# Patient Record
Sex: FEMALE | Race: OTHER | Employment: FULL TIME | URBAN - METROPOLITAN AREA
[De-identification: names, ages, dates, MRNs, and addresses within clinical notes are randomized per-mention and may not be internally consistent; named-entity substitution may affect disease eponyms.]

---

## 2017-02-24 DIAGNOSIS — Z86.69 HISTORY OF MIGRAINE HEADACHES: Primary | ICD-10-CM

## 2017-02-24 DIAGNOSIS — G43.709 CHRONIC MIGRAINE WITHOUT AURA WITHOUT STATUS MIGRAINOSUS, NOT INTRACTABLE: ICD-10-CM

## 2017-02-24 RX ORDER — ELETRIPTAN HYDROBROMIDE 40 MG/1
TABLET, FILM COATED ORAL
Qty: 12 TABLET | Refills: 2 | Status: SHIPPED | OUTPATIENT
Start: 2017-02-24 | End: 2017-05-15

## 2017-02-24 NOTE — TELEPHONE ENCOUNTER
Medication: Relpax     Date of last refill: 10/10/16  [place]    Last office visit: 7/5/16  Due back to clinic per last office note: 6 months   Date next office visit scheduled: None scheduled     Last OV note recomme

## 2017-02-26 ENCOUNTER — HOSPITAL ENCOUNTER (OUTPATIENT)
Age: 32
Discharge: HOME | End: 2017-02-26
Attending: FAMILY MEDICINE

## 2017-02-26 ENCOUNTER — APPOINTMENT (OUTPATIENT)
Dept: GENERAL RADIOLOGY | Age: 32
End: 2017-02-26
Attending: FAMILY MEDICINE

## 2017-02-26 VITALS
WEIGHT: 253.5 LBS | HEART RATE: 97 BPM | DIASTOLIC BLOOD PRESSURE: 91 MMHG | RESPIRATION RATE: 24 BRPM | BODY MASS INDEX: 37 KG/M2 | TEMPERATURE: 97 F | OXYGEN SATURATION: 98 % | SYSTOLIC BLOOD PRESSURE: 143 MMHG

## 2017-02-26 DIAGNOSIS — J40 BRONCHITIS: Primary | ICD-10-CM

## 2017-02-26 PROCEDURE — 71020 XR CHEST PA + LAT CHEST (CPT=71020): CPT

## 2017-02-26 PROCEDURE — 99214 OFFICE O/P EST MOD 30 MIN: CPT

## 2017-02-26 PROCEDURE — 94640 AIRWAY INHALATION TREATMENT: CPT

## 2017-02-26 RX ORDER — IPRATROPIUM BROMIDE AND ALBUTEROL SULFATE 2.5; .5 MG/3ML; MG/3ML
3 SOLUTION RESPIRATORY (INHALATION) ONCE
Status: COMPLETED | OUTPATIENT
Start: 2017-02-26 | End: 2017-02-26

## 2017-02-26 RX ORDER — PREDNISONE 20 MG/1
TABLET ORAL
Qty: 16 TABLET | Refills: 0 | Status: SHIPPED | OUTPATIENT
Start: 2017-02-26 | End: 2017-03-31 | Stop reason: ALTCHOICE

## 2017-02-26 RX ORDER — PREDNISONE 20 MG/1
60 TABLET ORAL ONCE
Status: COMPLETED | OUTPATIENT
Start: 2017-02-26 | End: 2017-02-26

## 2017-02-26 RX ORDER — CODEINE PHOSPHATE AND GUAIFENESIN 10; 100 MG/5ML; MG/5ML
5 SOLUTION ORAL EVERY 6 HOURS PRN
Qty: 100 ML | Refills: 0 | Status: SHIPPED | OUTPATIENT
Start: 2017-02-26 | End: 2017-03-31 | Stop reason: ALTCHOICE

## 2017-02-26 NOTE — ED INITIAL ASSESSMENT (HPI)
Pt with cough and dyspnea since yesterday morning. Pt with fever yesterday of 100.5. Pt using home nebulizer every 4-5 hours.   Last neb at 10:00am.

## 2017-02-26 NOTE — ED PROVIDER NOTES
Patient Seen in: [facility]    History   Patient presents with:  Cough/URI    Stated Complaint: cough/sinus pressure/sob x 1 day    HPI    28year old female presents for cough, congestion and shortness of breath.  States started 1 TABLET BY MOUTH 1 TIME ONLY. REPEAT AFTER 2 HOURS AS NEEDED.  MAX 2 PER DAY   Lurasidone HCl (LATUDA) 60 MG Oral Tab,  TAKE 1 TABLET(60 MG) BY MOUTH DAILY WITH DINNER   Sertraline HCl 100 MG Oral Tab,  TAKE 2 TABLETS BY MOUTH EVERY NIGHT   LORazepam 0.5 M Relation Age of Onset   • Psychiatric Maternal Grandmother      ALZHEIMER   • Diabetes Maternal Grandmother    • Stroke Maternal Grandmother    • High Cholesterol Mother    • Diabetes Mother    • Stroke Mother    • Depression Mother    • High Cholesterol M normal. She has decreased breath sounds in the right lower field and the left lower field. She has no rhonchi. She has no rales. Neurological: She is alert and oriented to person, place, and time. Skin: Skin is warm and dry.    Psychiatric: She has a no

## 2017-03-07 ENCOUNTER — OFFICE VISIT (OUTPATIENT)
Dept: NEUROLOGY | Facility: CLINIC | Age: 32
End: 2017-03-07

## 2017-03-07 VITALS
BODY MASS INDEX: 39.7 KG/M2 | HEIGHT: 68.5 IN | WEIGHT: 265 LBS | HEART RATE: 90 BPM | SYSTOLIC BLOOD PRESSURE: 118 MMHG | RESPIRATION RATE: 18 BRPM | DIASTOLIC BLOOD PRESSURE: 74 MMHG

## 2017-03-07 DIAGNOSIS — G43.009 MIGRAINE WITHOUT AURA AND WITHOUT STATUS MIGRAINOSUS, NOT INTRACTABLE: Primary | ICD-10-CM

## 2017-03-07 PROCEDURE — 99213 OFFICE O/P EST LOW 20 MIN: CPT | Performed by: OTHER

## 2017-03-07 RX ORDER — TOPIRAMATE 100 MG/1
100 TABLET, FILM COATED ORAL 2 TIMES DAILY
Qty: 180 TABLET | Refills: 0 | Status: SHIPPED | OUTPATIENT
Start: 2017-03-07 | End: 2017-05-15

## 2017-03-07 NOTE — PATIENT INSTRUCTIONS
Refill policies:    • Allow 2 business days for refills; controlled substances may take longer.   • Contact your pharmacy at least 5 days prior to running out of medication and have them send an electronic request or submit request through the “request re your physician has recommended that you have a procedure or additional testing performed. [facility]) will contact your insurance carrier to obtain pre-certification or prior authorization.     Unfortunately, [facility] has seen an increas

## 2017-03-07 NOTE — PROGRESS NOTES
HPI:    Patient ID: Wilmar Jo is a 28year old female. Migraine   Pertinent negatives include no neck pain. Jayro Stahl is here for follow up for headaches. She states migraine were well controlled since we increase Topamax.   Seh is getting mild hea Packs/Day: 1.00  Years: 10        Types: Cigarettes    Smokeless Status: Never Used                        Alcohol Use: No                     Review of Systems   Constitutional: Negative. Eyes: Negative. Respiratory: Negative.     Cardiovascular: Disp: 60 tablet Rfl: 1   LANSOPRAZOLE 30 MG Oral Capsule Delayed Release TAKE ONE CAPSULE BY MOUTH EVERY MORNING BEFORE BREAKFAST Disp: 90 capsule Rfl: 1   Montelukast Sodium (SINGULAIR) 10 MG Oral Tab TAKE 1 TABLET BY MOUTH NIGHTLY Disp: 90 tablet Rfl: 1 aura and without status migrainosus, not intractable  (primary encounter diagnosis)      Continue current medications.  For headache seasonal flare up she can take extra topamax 50 mg during that week  Patient indicates understanding    F/u in 9-12 months

## 2017-04-28 PROBLEM — S63.630A SPRAIN OF INTERPHALANGEAL JOINT OF RIGHT INDEX FINGER, INITIAL ENCOUNTER: Status: ACTIVE | Noted: 2017-04-28

## 2017-04-28 PROBLEM — S63.632A SPRAIN OF INTERPHALANGEAL JOINT OF RIGHT MIDDLE FINGER, INITIAL ENCOUNTER: Status: ACTIVE | Noted: 2017-04-28

## 2017-05-01 ENCOUNTER — LAB ENCOUNTER (OUTPATIENT)
Dept: LAB | Facility: HOSPITAL | Age: 32
End: 2017-05-01
Attending: PHYSICIAN ASSISTANT
Payer: COMMERCIAL

## 2017-05-01 DIAGNOSIS — R10.84 GENERALIZED ABDOMINAL PAIN: ICD-10-CM

## 2017-05-01 DIAGNOSIS — R19.7 DIARRHEA, UNSPECIFIED TYPE: ICD-10-CM

## 2017-05-01 DIAGNOSIS — E61.1 IRON DEFICIENCY: ICD-10-CM

## 2017-05-01 DIAGNOSIS — R11.0 NAUSEA: ICD-10-CM

## 2017-05-01 DIAGNOSIS — E55.9 VITAMIN D DEFICIENCY: ICD-10-CM

## 2017-05-01 PROCEDURE — 82150 ASSAY OF AMYLASE: CPT

## 2017-05-01 PROCEDURE — 80053 COMPREHEN METABOLIC PANEL: CPT

## 2017-05-01 PROCEDURE — 83550 IRON BINDING TEST: CPT

## 2017-05-01 PROCEDURE — 83690 ASSAY OF LIPASE: CPT

## 2017-05-01 PROCEDURE — 85025 COMPLETE CBC W/AUTO DIFF WBC: CPT

## 2017-05-01 PROCEDURE — 36415 COLL VENOUS BLD VENIPUNCTURE: CPT

## 2017-05-01 PROCEDURE — 83540 ASSAY OF IRON: CPT

## 2017-05-01 PROCEDURE — 82306 VITAMIN D 25 HYDROXY: CPT

## 2017-05-02 ENCOUNTER — APPOINTMENT (OUTPATIENT)
Dept: LAB | Facility: HOSPITAL | Age: 32
End: 2017-05-02
Attending: PHYSICIAN ASSISTANT
Payer: COMMERCIAL

## 2017-05-02 DIAGNOSIS — R11.0 NAUSEA: ICD-10-CM

## 2017-05-02 DIAGNOSIS — R10.84 GENERALIZED ABDOMINAL PAIN: ICD-10-CM

## 2017-05-02 DIAGNOSIS — R19.7 DIARRHEA, UNSPECIFIED TYPE: ICD-10-CM

## 2017-05-02 PROCEDURE — 87493 C DIFF AMPLIFIED PROBE: CPT

## 2017-05-03 NOTE — PROGRESS NOTES
Quick Note:    Brianna Isaacs  The vitamin D is low. I sent a prescription for Vitamin D to take twice weekly. It is for 50,000 units per dose. You can stop the over the counter vitamin D.   Shantel Luke    ______

## 2017-05-04 ENCOUNTER — APPOINTMENT (OUTPATIENT)
Dept: GENERAL RADIOLOGY | Facility: HOSPITAL | Age: 32
End: 2017-05-04
Attending: EMERGENCY MEDICINE
Payer: COMMERCIAL

## 2017-05-04 ENCOUNTER — HOSPITAL ENCOUNTER (EMERGENCY)
Facility: HOSPITAL | Age: 32
Discharge: HOME | End: 2017-05-04
Attending: EMERGENCY MEDICINE
Payer: COMMERCIAL

## 2017-05-04 VITALS
SYSTOLIC BLOOD PRESSURE: 123 MMHG | HEART RATE: 60 BPM | BODY MASS INDEX: 38 KG/M2 | WEIGHT: 260 LBS | TEMPERATURE: 98 F | DIASTOLIC BLOOD PRESSURE: 85 MMHG | OXYGEN SATURATION: 100 % | RESPIRATION RATE: 16 BRPM

## 2017-05-04 DIAGNOSIS — R10.9 ABDOMINAL PAIN, ACUTE: Primary | ICD-10-CM

## 2017-05-04 DIAGNOSIS — A04.72 CLOSTRIDIUM DIFFICILE COLITIS: ICD-10-CM

## 2017-05-04 PROCEDURE — 96375 TX/PRO/DX INJ NEW DRUG ADDON: CPT

## 2017-05-04 PROCEDURE — 80053 COMPREHEN METABOLIC PANEL: CPT | Performed by: EMERGENCY MEDICINE

## 2017-05-04 PROCEDURE — 83690 ASSAY OF LIPASE: CPT | Performed by: EMERGENCY MEDICINE

## 2017-05-04 PROCEDURE — 85025 COMPLETE CBC W/AUTO DIFF WBC: CPT | Performed by: EMERGENCY MEDICINE

## 2017-05-04 PROCEDURE — 74020 XR ABDOMEN, OBSTRUCTIVE SERIES (CPT=74020): CPT | Performed by: EMERGENCY MEDICINE

## 2017-05-04 PROCEDURE — 99285 EMERGENCY DEPT VISIT HI MDM: CPT

## 2017-05-04 PROCEDURE — 99284 EMERGENCY DEPT VISIT MOD MDM: CPT

## 2017-05-04 PROCEDURE — 96361 HYDRATE IV INFUSION ADD-ON: CPT

## 2017-05-04 PROCEDURE — 96365 THER/PROPH/DIAG IV INF INIT: CPT

## 2017-05-04 RX ORDER — HYDROMORPHONE HYDROCHLORIDE 1 MG/ML
INJECTION, SOLUTION INTRAMUSCULAR; INTRAVENOUS; SUBCUTANEOUS EVERY 30 MIN PRN
Status: DISCONTINUED | OUTPATIENT
Start: 2017-05-04 | End: 2017-05-05

## 2017-05-05 NOTE — ED PROVIDER NOTES
Patient Seen in: [facility] Emergency Department    History   Patient presents with:  Abdomen/Flank Pain (GI/)    Stated Complaint: abd pain     HPI    Patient was diagnosed with C. difficile 4 days ago. She has had it a few years ago as well.   Mata Sol 10-4-14       Medications :   lurasidone HCl (LATUDA) 80 MG Oral Tab,  Take 1 tablet (80 mg total) by mouth daily with dinner.    Sertraline HCl 100 MG Oral Tab,  TAKE 2 TABLETS BY MOUTH EVERY NIGHT   LORazepam 0.5 MG Oral Tab,  Take 1-2 tab by mouth at bed ONCE DAILY       Family History   Problem Relation Age of Onset   • Psychiatric Maternal Grandmother      ALZHEIMER   • Diabetes Maternal Grandmother    • Stroke Maternal Grandmother    • High Cholesterol Mother    • Diabetes Mother    • Stroke Mother swelling, deformity   Skin: No significant lesions   Neuro: Grossly intact to patient's baseline      ED Course     Labs Reviewed   COMP METABOLIC PANEL (14) - Abnormal; Notable for the following:     BUN 6 (*)     AST 12 (*)     Chloride 113 (*)     CO2 1

## 2017-05-05 NOTE — ED INITIAL ASSESSMENT (HPI)
31YF c/c of abd pain pt state she being treated for c-diff Pt state that tonight she started sudden increased abd pain

## 2017-05-09 ENCOUNTER — HOSPITAL ENCOUNTER (OUTPATIENT)
Dept: ULTRASOUND IMAGING | Age: 32
Discharge: HOME | End: 2017-05-09
Attending: NURSE PRACTITIONER
Payer: COMMERCIAL

## 2017-05-09 DIAGNOSIS — R11.0 NAUSEA: ICD-10-CM

## 2017-05-09 DIAGNOSIS — R10.811 RIGHT UPPER QUADRANT ABDOMINAL TENDERNESS, REBOUND TENDERNESS PRESENCE NOT SPECIFIED: ICD-10-CM

## 2017-05-09 PROCEDURE — 76700 US EXAM ABDOM COMPLETE: CPT | Performed by: NURSE PRACTITIONER

## 2017-05-15 ENCOUNTER — OFFICE VISIT (OUTPATIENT)
Dept: NEUROLOGY | Facility: CLINIC | Age: 32
End: 2017-05-15

## 2017-05-15 VITALS
HEART RATE: 78 BPM | BODY MASS INDEX: 38.34 KG/M2 | HEIGHT: 68 IN | RESPIRATION RATE: 16 BRPM | DIASTOLIC BLOOD PRESSURE: 82 MMHG | SYSTOLIC BLOOD PRESSURE: 120 MMHG | WEIGHT: 253 LBS

## 2017-05-15 DIAGNOSIS — G43.709 CHRONIC MIGRAINE W/O AURA W/O STATUS MIGRAINOSUS, NOT INTRACTABLE: Primary | ICD-10-CM

## 2017-05-15 PROCEDURE — 99213 OFFICE O/P EST LOW 20 MIN: CPT | Performed by: OTHER

## 2017-05-15 PROCEDURE — 96372 THER/PROPH/DIAG INJ SC/IM: CPT | Performed by: OTHER

## 2017-05-15 RX ORDER — NARATRIPTAN 2.5 MG/1
2.5 TABLET ORAL AS NEEDED
Qty: 8 TABLET | Refills: 0 | Status: SHIPPED | OUTPATIENT
Start: 2017-05-15 | End: 2017-06-09

## 2017-05-15 RX ORDER — DEXAMETHASONE SODIUM PHOSPHATE 10 MG/ML
10 INJECTION, SOLUTION INTRAMUSCULAR; INTRAVENOUS ONCE
Status: COMPLETED | OUTPATIENT
Start: 2017-05-15 | End: 2017-05-15

## 2017-05-15 RX ORDER — TOPIRAMATE 100 MG/1
TABLET, FILM COATED ORAL
Qty: 90 TABLET | Refills: 5 | Status: SHIPPED | OUTPATIENT
Start: 2017-05-15 | End: 2018-01-29

## 2017-05-15 RX ORDER — METRONIDAZOLE 500 MG/1
TABLET ORAL 3 TIMES DAILY
Refills: 0 | COMMUNITY
Start: 2017-05-02 | End: 2017-06-09 | Stop reason: ALTCHOICE

## 2017-05-15 RX ADMIN — DEXAMETHASONE SODIUM PHOSPHATE 10 MG: 10 INJECTION, SOLUTION INTRAMUSCULAR; INTRAVENOUS at 16:58:00

## 2017-05-15 NOTE — PATIENT INSTRUCTIONS
Refill policies:    • Allow 2 business days for refills; controlled substances may take longer.   • Contact your pharmacy at least 5 days prior to running out of medication and have them send an electronic request or submit request through the “request re insurance carrier to obtain pre-certification or prior authorization. Unfortunately, [facility] has seen an increase in denial of payment even though the procedure/test has been pre-certified.   You are strongly encouraged to contact your insurance carrier to v

## 2017-05-16 ENCOUNTER — OFFICE VISIT (OUTPATIENT)
Dept: SURGERY | Facility: CLINIC | Age: 32
End: 2017-05-16

## 2017-05-16 VITALS
SYSTOLIC BLOOD PRESSURE: 111 MMHG | HEART RATE: 82 BPM | DIASTOLIC BLOOD PRESSURE: 80 MMHG | WEIGHT: 252 LBS | HEIGHT: 68 IN | BODY MASS INDEX: 38.19 KG/M2

## 2017-05-16 DIAGNOSIS — K80.10 CALCULUS OF GALLBLADDER WITH CHOLECYSTITIS WITHOUT BILIARY OBSTRUCTION, UNSPECIFIED CHOLECYSTITIS ACUITY: Primary | ICD-10-CM

## 2017-05-16 PROCEDURE — 99243 OFF/OP CNSLTJ NEW/EST LOW 30: CPT | Performed by: SURGERY

## 2017-05-16 NOTE — PROGRESS NOTES
HPI:    Patient ID: Angie Walter is a 28year old female. Migraine   Pertinent negatives include no neck pain. Amandabisi Mark is here for follow up for headaches. She states her headache got worse again.  She lately has been under stress a lot and that see • Hypertension Maternal Grandfather    • Substance Abuse Father         Smoking Status: Current Every Day Smoker        Packs/Day: 1.00  Years: 10        Types: Cigarettes    Smokeless Status: Never Used                        Alcohol Use: No Breath. Disp: 1 Inhaler Rfl: 2   Norethindrone 0.35 MG Oral Tab Take one tablet daily Disp: 3 Package Rfl: 4   Butalbital-APAP-Caffeine -40 MG Oral Tab Take 1 tablet by mouth every 12 (twelve) hours as needed.  Disp: 60 tablet Rfl: 1   LANSOPRAZOLE 30 bilaterally. Finger-to-nose coordination is intact. Gait brisk and steady. ASSESSMENT/PLAN:   Chronic migraine w/o aura w/o status migrainosus, not intractable  (primary encounter diagnosis)    Mild worsening headaches could be due to stress.  If p

## 2017-05-17 ENCOUNTER — TELEPHONE (OUTPATIENT)
Dept: SURGERY | Facility: CLINIC | Age: 32
End: 2017-05-17

## 2017-05-17 ENCOUNTER — APPOINTMENT (OUTPATIENT)
Dept: LAB | Facility: HOSPITAL | Age: 32
End: 2017-05-17
Payer: COMMERCIAL

## 2017-05-17 DIAGNOSIS — K80.10 CALCULUS OF GALLBLADDER WITH CHOLECYSTITIS WITHOUT BILIARY OBSTRUCTION, UNSPECIFIED CHOLECYSTITIS ACUITY: ICD-10-CM

## 2017-05-17 DIAGNOSIS — K80.01 CALCULUS OF GALLBLADDER WITH ACUTE CHOLECYSTITIS AND OBSTRUCTION: Primary | ICD-10-CM

## 2017-05-17 PROBLEM — K80.20 CHOLELITHIASIS: Status: ACTIVE | Noted: 2017-05-17

## 2017-05-17 PROCEDURE — 36415 COLL VENOUS BLD VENIPUNCTURE: CPT

## 2017-05-17 PROCEDURE — 85730 THROMBOPLASTIN TIME PARTIAL: CPT

## 2017-05-17 PROCEDURE — 85610 PROTHROMBIN TIME: CPT

## 2017-05-17 NOTE — H&P
New Patient  Alanna Ríos  5/27/1985 5/17/2017    This patient was referred by Fidencio Hamilton MD for evaluation and consultation for abdominal pain and GI symptoms. Chief Complaint: Abdominal pain    History of Present Illness:  The patient is a 30-ye COLPO   • MTHFR (methylene THF reductase) deficiency and homocystinuria (HCC)    • Protein S deficiency (HCC)    • Esophageal reflux    • Extrinsic asthma, unspecified    • Obesity, unspecified    • Chronic rhinitis    • Anxiety state, unspecified    • Uns 2.5 MG Oral Tab, Take 1 tablet (2.5 mg total) by mouth as needed for Migraine.  Use at onset; may repeat once after 4 hours- ONLY 2 IN 24 HOUR PERIOD MAX, Disp: 8 tablet, Rfl: 0  •  Sertraline HCl 100 MG Oral Tab, TAKE 2 TABLETS BY MOUTH EVERY NIGHT, Disp: Comment:SYEDA SX  Adhesive Tape           Hives  Doxycycline                 Comment:SYEDA upset    Family History   Problem Relation Age of Onset   • Psychiatric Maternal Grandmother      ALZHEIMER   • Diabetes Maternal Grandmother    • Stroke Maternal Grandmot questions were presented at this time. Assessment   Assessment:      The patient is a 62-year-old female who is here for evaluation of abdominal pain. The patient was recently diagnosed with her second episode of C. difficile colitis.   This was thought Vivian Rodriguez MD

## 2017-05-22 ENCOUNTER — SURGERY (OUTPATIENT)
Age: 32
End: 2017-05-22

## 2017-05-22 ENCOUNTER — ANESTHESIA EVENT (OUTPATIENT)
Dept: SURGERY | Facility: HOSPITAL | Age: 32
End: 2017-05-22
Payer: COMMERCIAL

## 2017-05-22 ENCOUNTER — APPOINTMENT (OUTPATIENT)
Dept: GENERAL RADIOLOGY | Facility: HOSPITAL | Age: 32
End: 2017-05-22
Attending: SURGERY
Payer: COMMERCIAL

## 2017-05-22 ENCOUNTER — HOSPITAL ENCOUNTER (OUTPATIENT)
Facility: HOSPITAL | Age: 32
Setting detail: HOSPITAL OUTPATIENT SURGERY
Discharge: HOME | End: 2017-05-22
Attending: SURGERY | Admitting: SURGERY
Payer: COMMERCIAL

## 2017-05-22 ENCOUNTER — ANESTHESIA (OUTPATIENT)
Dept: SURGERY | Facility: HOSPITAL | Age: 32
End: 2017-05-22
Payer: COMMERCIAL

## 2017-05-22 VITALS
OXYGEN SATURATION: 100 % | HEIGHT: 68 IN | WEIGHT: 258 LBS | HEART RATE: 80 BPM | TEMPERATURE: 98 F | DIASTOLIC BLOOD PRESSURE: 80 MMHG | BODY MASS INDEX: 39.1 KG/M2 | RESPIRATION RATE: 18 BRPM | SYSTOLIC BLOOD PRESSURE: 130 MMHG

## 2017-05-22 DIAGNOSIS — K80.10 CALCULUS OF GALLBLADDER WITH CHOLECYSTITIS WITHOUT BILIARY OBSTRUCTION, UNSPECIFIED CHOLECYSTITIS ACUITY: Primary | ICD-10-CM

## 2017-05-22 PROCEDURE — 0FT44ZZ RESECTION OF GALLBLADDER, PERCUTANEOUS ENDOSCOPIC APPROACH: ICD-10-PCS | Performed by: SURGERY

## 2017-05-22 PROCEDURE — 88304 TISSUE EXAM BY PATHOLOGIST: CPT | Performed by: SURGERY

## 2017-05-22 PROCEDURE — 81025 URINE PREGNANCY TEST: CPT | Performed by: SURGERY

## 2017-05-22 PROCEDURE — 74300 X-RAY BILE DUCTS/PANCREAS: CPT | Performed by: SURGERY

## 2017-05-22 RX ORDER — HYDROCODONE BITARTRATE AND ACETAMINOPHEN 5; 325 MG/1; MG/1
2 TABLET ORAL AS NEEDED
Status: DISCONTINUED | OUTPATIENT
Start: 2017-05-22 | End: 2017-05-22

## 2017-05-22 RX ORDER — SODIUM CHLORIDE, SODIUM LACTATE, POTASSIUM CHLORIDE, CALCIUM CHLORIDE 600; 310; 30; 20 MG/100ML; MG/100ML; MG/100ML; MG/100ML
INJECTION, SOLUTION INTRAVENOUS CONTINUOUS
Status: DISCONTINUED | OUTPATIENT
Start: 2017-05-22 | End: 2017-05-22

## 2017-05-22 RX ORDER — BUPIVACAINE HYDROCHLORIDE AND EPINEPHRINE 5; 5 MG/ML; UG/ML
INJECTION, SOLUTION EPIDURAL; INTRACAUDAL; PERINEURAL AS NEEDED
Status: DISCONTINUED | OUTPATIENT
Start: 2017-05-22 | End: 2017-05-22 | Stop reason: HOSPADM

## 2017-05-22 RX ORDER — HEPARIN SODIUM 5000 [USP'U]/ML
5000 INJECTION, SOLUTION INTRAVENOUS; SUBCUTANEOUS ONCE
Status: COMPLETED | OUTPATIENT
Start: 2017-05-22 | End: 2017-05-22

## 2017-05-22 RX ORDER — HYDROCODONE BITARTRATE AND ACETAMINOPHEN 5; 325 MG/1; MG/1
1 TABLET ORAL AS NEEDED
Status: DISCONTINUED | OUTPATIENT
Start: 2017-05-22 | End: 2017-05-22

## 2017-05-22 RX ORDER — HYDROMORPHONE HYDROCHLORIDE 1 MG/ML
0.4 INJECTION, SOLUTION INTRAMUSCULAR; INTRAVENOUS; SUBCUTANEOUS EVERY 5 MIN PRN
Status: DISCONTINUED | OUTPATIENT
Start: 2017-05-22 | End: 2017-05-22

## 2017-05-22 RX ORDER — MEPERIDINE HYDROCHLORIDE 25 MG/ML
INJECTION INTRAMUSCULAR; INTRAVENOUS; SUBCUTANEOUS
Status: DISCONTINUED
Start: 2017-05-22 | End: 2017-05-22 | Stop reason: WASHOUT

## 2017-05-22 RX ORDER — HYDROMORPHONE HYDROCHLORIDE 1 MG/ML
INJECTION, SOLUTION INTRAMUSCULAR; INTRAVENOUS; SUBCUTANEOUS
Status: COMPLETED
Start: 2017-05-22 | End: 2017-05-22

## 2017-05-22 RX ORDER — ONDANSETRON 2 MG/ML
4 INJECTION INTRAMUSCULAR; INTRAVENOUS AS NEEDED
Status: DISCONTINUED | OUTPATIENT
Start: 2017-05-22 | End: 2017-05-22

## 2017-05-22 RX ORDER — CEFOXITIN 2 G/1
INJECTION, POWDER, FOR SOLUTION INTRAVENOUS
Status: DISCONTINUED | OUTPATIENT
Start: 2017-05-22 | End: 2017-05-22

## 2017-05-22 RX ORDER — NALOXONE HYDROCHLORIDE 0.4 MG/ML
80 INJECTION, SOLUTION INTRAMUSCULAR; INTRAVENOUS; SUBCUTANEOUS AS NEEDED
Status: DISCONTINUED | OUTPATIENT
Start: 2017-05-22 | End: 2017-05-22

## 2017-05-22 RX ORDER — HEPARIN SODIUM 5000 [USP'U]/ML
INJECTION, SOLUTION INTRAVENOUS; SUBCUTANEOUS
Status: COMPLETED
Start: 2017-05-22 | End: 2017-05-22

## 2017-05-22 NOTE — BRIEF OP NOTE
Pre-Operative Diagnosis: Calculus of gallbladder with cholecystitis without biliary obstruction, unspecified cholecystitis acuity [K80.00]     Post-Operative Diagnosis: Cholelithiasis without biliary obstruction-ne IOC     Procedure Performed:   Procedu

## 2017-05-22 NOTE — ANESTHESIA PREPROCEDURE EVALUATION
PRE-OP EVALUATION    Patient Name: Calixto Mendez    Pre-op Diagnosis: Calculus of gallbladder with cholecystitis without biliary obstruction, unspecified cholecystitis acuity [K80.00]    Procedure(s):  LAPAROSCOPIC CHOLECYSTECTOMY WITH CHOLANGIOGRAM    Pena (2.5 MG/3ML) 0.083% Inhalation Nebu Soln Take 3 mL (2.5 mg total) by nebulization every 6 (six) hours as needed.  Disp: 1 Bottle Rfl: 6   Albuterol Sulfate  (90 BASE) MCG/ACT Inhalation Aero Soln Inhale 2 puffs into the lungs every 4 (four) hours as ENDOSCOPY W/US FN BX  05/06/2011    COLONOSCOPY  05/06/2011    Comment wnl per pt    COLONOSCOPY      OTHER SURGICAL HISTORY Right 1998    Comment KNEE ARTHROSCOPY    OTHER SURGICAL HISTORY      Comment left eye tear duct    OTHER SURGICAL HISTORY      Com

## 2017-05-22 NOTE — INTERVAL H&P NOTE
Pre-op Diagnosis: Calculus of gallbladder with cholecystitis without biliary obstruction, unspecified cholecystitis acuity [K80.00]    The above referenced H&P was reviewed by Elba Fay MD on 5/22/2017, the patient was examined and no significant austin

## 2017-05-22 NOTE — OPERATIVE REPORT
[facility]   Operative Note    [facility] Location: OR   [facility] [id number] MRN [id number]   Admission Date 5/22/2017 Operation Date 5/22/2017   Attending Physician Ana Soriano MD Operating Physician Miranda Monsivais MD     Date of procedure:    May ultrasound was ordered at that time however as her symptoms did resolve she did not proceed with diagnostic imaging at that time. On physical examination, Finesse Reyes does have some tenderness in the right upper quadrant.   Treatment options were reviewed and a to 15 mm mercury. The Veress needle was then exchanged for a 11 mm port. The laparoscope was introduced. A 5 mm epigastric port and two 5 mm lateral ports were placed under direct vision without incidence.   The patient was placed into a reverse Trendelenb was placed into a Endopouch and was withdrawn through the umbilical port. The right upper quadrant was copiously irrigated until the irrigant was clear.  The clips across the cystic duct and the cystic artery were examined and found to be well approximated

## 2017-05-22 NOTE — ANESTHESIA POSTPROCEDURE EVALUATION
[address] [facility] Patient Status:  Hospital Outpatient Surgery   Age/Gender 28year old female MRN [id number]   Location [address] [facility] Attending Annabel Palmer MD   Hosp Day # 0 PCP MD Khoa George

## 2017-05-22 NOTE — H&P (VIEW-ONLY)
New Patient  Suzie Bolivar  5/27/1985 5/17/2017    This patient was referred by Sumi Moore MD for evaluation and consultation for abdominal pain and GI symptoms. Chief Complaint: Abdominal pain    History of Present Illness:  The patient is a 30-ye COLPO   • MTHFR (methylene THF reductase) deficiency and homocystinuria (HCC)    • Protein S deficiency (HCC)    • Esophageal reflux    • Extrinsic asthma, unspecified    • Obesity, unspecified    • Chronic rhinitis    • Anxiety state, unspecified    • Uns 2.5 MG Oral Tab, Take 1 tablet (2.5 mg total) by mouth as needed for Migraine.  Use at onset; may repeat once after 4 hours- ONLY 2 IN 24 HOUR PERIOD MAX, Disp: 8 tablet, Rfl: 0  •  Sertraline HCl 100 MG Oral Tab, TAKE 2 TABLETS BY MOUTH EVERY NIGHT, Disp: Comment:SYEDA SX  Adhesive Tape           Hives  Doxycycline                 Comment:SYEDA upset    Family History   Problem Relation Age of Onset   • Psychiatric Maternal Grandmother      ALZHEIMER   • Diabetes Maternal Grandmother    • Stroke Maternal Grandmot questions were presented at this time. Assessment   Assessment:      The patient is a 73-year-old female who is here for evaluation of abdominal pain. The patient was recently diagnosed with her second episode of C. difficile colitis.   This was thought Shawn Eagle MD

## 2017-05-24 ENCOUNTER — TELEPHONE (OUTPATIENT)
Dept: SURGERY | Facility: CLINIC | Age: 32
End: 2017-05-24

## 2017-05-24 NOTE — TELEPHONE ENCOUNTER
Spoke to patient 5/23/17. Patient is p/o lap shannan done 5/22/17 with Dr. Fred Vazquez. C/o 7/10 abdominal pain at incision by her belly button. Taking Norco. Denies any nausea, vomiting, fever or chills. Notified Dr. Fred Vazquez.  Called patient today 5/24/17 for condit

## 2017-05-26 ENCOUNTER — TELEPHONE (OUTPATIENT)
Dept: SURGERY | Facility: CLINIC | Age: 32
End: 2017-05-26

## 2017-05-26 NOTE — TELEPHONE ENCOUNTER
5/22 adi campbell noticed bruise at umbilical incision after vacuming. Instructed pt to limited this activity along with lifting, pushing, pulling for at least 1 week. Pt denies fever, chills, drainage. Pt also verbalized understanding.

## 2017-06-06 ENCOUNTER — TELEPHONE (OUTPATIENT)
Dept: NEUROLOGY | Facility: CLINIC | Age: 32
End: 2017-06-06

## 2017-06-06 ENCOUNTER — OFFICE VISIT (OUTPATIENT)
Dept: SURGERY | Facility: CLINIC | Age: 32
End: 2017-06-06

## 2017-06-06 VITALS
DIASTOLIC BLOOD PRESSURE: 80 MMHG | HEIGHT: 68 IN | BODY MASS INDEX: 39.1 KG/M2 | WEIGHT: 258 LBS | HEART RATE: 78 BPM | SYSTOLIC BLOOD PRESSURE: 114 MMHG

## 2017-06-06 DIAGNOSIS — Z86.69 HISTORY OF MIGRAINE HEADACHES: Primary | ICD-10-CM

## 2017-06-06 DIAGNOSIS — Z90.49 S/P LAPAROSCOPIC CHOLECYSTECTOMY: Primary | ICD-10-CM

## 2017-06-06 PROCEDURE — 99024 POSTOP FOLLOW-UP VISIT: CPT | Performed by: SURGERY

## 2017-06-06 RX ORDER — HYDROCODONE BITARTRATE AND ACETAMINOPHEN 5; 325 MG/1; MG/1
TABLET ORAL
Refills: 0 | COMMUNITY
Start: 2017-05-22 | End: 2017-06-06

## 2017-06-06 RX ORDER — METHYLPREDNISOLONE 4 MG/1
TABLET ORAL
Qty: 1 KIT | Refills: 0 | Status: SHIPPED | OUTPATIENT
Start: 2017-06-06 | End: 2017-06-28

## 2017-06-06 NOTE — TELEPHONE ENCOUNTER
Patient states she has a Migraine - day 3, states \"today is worst day\", patient reports migraine started at right temple and moved to right eye, she sates pain is throbbing with stabbing pain in the eye and forehead.  Patient states \"Today my whole head

## 2017-06-06 NOTE — TELEPHONE ENCOUNTER
Spoke with patient and informed medrol does mike approved by Dr Nara Munroe and sent to pharmacy. She verbalized understanding. Offered patient follow up appt with Dr Nara Munroe 6/9/17 at 1:40 pm to discuss medications . Appt accepted. Patient scheduled.

## 2017-06-06 NOTE — PROGRESS NOTES
Post Operative Visit Note       Active Problems  No diagnosis found. Chief Complaint   Patient presents with:  Gallbladder: 1st PO LAPAROSCOPIC CHOLECYSTECTOMY WITH CHOLANGIOGRAM on 5/22 @.          History of Present Illness         Allergies  Gia Au Stroke Maternal Grandmother    • High Cholesterol Mother    • Diabetes Mother    • Stroke Mother    • Depression Mother    • High Cholesterol Maternal Grandfather    • Stroke Maternal Grandfather    • Hypertension Maternal Grandfather    • Substance Abuse mouth twice a week. Disp: 8 capsule Rfl: 11   B Complex Vitamins (B COMPLEX OR) Take 1 capsule by mouth every evening. Disp:  Rfl:    Dicyclomine HCl 10 MG Oral Cap Take 10 mg by mouth as needed.  Disp:  Rfl:    albuterol sulfate (2.5 MG/3ML) 0.083% Inhalat syncope and weakness. Hematological: Negative for adenopathy. Does not bruise/bleed easily. Psychiatric/Behavioral: Negative for behavioral problems and sleep disturbance.        Physical Findings   LMP 05/12/2017  Physical Exam   Assessment    Assessme

## 2017-06-07 NOTE — PROGRESS NOTES
GENERAL SURGERY    Freddy Pemberton MD, FACS, Carla Walton. Jose Schneider MD, Srinivasan Renteria MD, FACS  Fatimah Marrero.  Fatemeh Joseph MD, Ashley Anthony MD, JOHNATHAN Teague PA-C, PA-C [facility]

## 2017-06-09 ENCOUNTER — OFFICE VISIT (OUTPATIENT)
Dept: NEUROLOGY | Facility: CLINIC | Age: 32
End: 2017-06-09

## 2017-06-09 VITALS
HEART RATE: 80 BPM | DIASTOLIC BLOOD PRESSURE: 76 MMHG | WEIGHT: 250 LBS | RESPIRATION RATE: 16 BRPM | BODY MASS INDEX: 38 KG/M2 | SYSTOLIC BLOOD PRESSURE: 126 MMHG

## 2017-06-09 DIAGNOSIS — G43.909 MIXED MIGRAINE AND MUSCLE CONTRACTION HEADACHE: Primary | ICD-10-CM

## 2017-06-09 DIAGNOSIS — G44.209 MIXED MIGRAINE AND MUSCLE CONTRACTION HEADACHE: Primary | ICD-10-CM

## 2017-06-09 PROCEDURE — 99213 OFFICE O/P EST LOW 20 MIN: CPT | Performed by: OTHER

## 2017-06-09 RX ORDER — ELETRIPTAN HYDROBROMIDE 40 MG/1
40 TABLET, FILM COATED ORAL AS NEEDED
Qty: 8 TABLET | Refills: 2 | Status: SHIPPED | OUTPATIENT
Start: 2017-06-09 | End: 2017-07-09

## 2017-06-09 RX ORDER — TIZANIDINE 2 MG/1
2 TABLET ORAL NIGHTLY
Qty: 30 TABLET | Refills: 2 | Status: SHIPPED | OUTPATIENT
Start: 2017-06-09 | End: 2019-06-04

## 2017-06-09 NOTE — PATIENT INSTRUCTIONS
Refill policies:    • Allow 2-3 business days for refills; controlled substances may take longer.   • Contact your pharmacy at least 5 days prior to running out of medication and have them send an electronic request or submit request through the [facility] have a procedure or additional testing performed. [facility]) will contact your insurance carrier to obtain pre-certification or prior authorization.     Unfortunately, [facility] has seen an increase in denial of payment even though the p

## 2017-06-09 NOTE — PROGRESS NOTES
HPI:    Patient ID: Lina Cedillo is a 33year old female. Migraine   Pertinent negatives include no neck pain. Finesse [place] is here for follow up for headaches. She states last week had a severe headache requiring medrol dose pack.  She complaints of nec History   Problem Relation Age of Onset   • Psychiatric Maternal Grandmother      ALZHEIMER   • Diabetes Maternal Grandmother    • Stroke Maternal Grandmother    • High Cholesterol Mother    • Diabetes Mother    • Stroke Mother    • Depression Mother    • Oral Cap Take 10 mg by mouth as needed. Disp:  Rfl:    albuterol sulfate (2.5 MG/3ML) 0.083% Inhalation Nebu Soln Take 3 mL (2.5 mg total) by nebulization every 6 (six) hours as needed.  Disp: 1 Bottle Rfl: 6   Albuterol Sulfate  (90 BASE) MCG/ACT In bilaterally. The rest of the cranial nerves are grossly intact. Sensation to light touch is intact bilaterally. Motor:  No arm or leg weakness noted. 5/5 bilaterally. Finger-to-nose coordination is intact. Gait brisk and steady.           ASSESSMENT/PLAN:

## 2017-08-09 ENCOUNTER — OFFICE VISIT (OUTPATIENT)
Dept: NEUROLOGY | Facility: CLINIC | Age: 32
End: 2017-08-09

## 2017-08-09 VITALS
HEART RATE: 64 BPM | WEIGHT: 260 LBS | BODY MASS INDEX: 40 KG/M2 | DIASTOLIC BLOOD PRESSURE: 70 MMHG | SYSTOLIC BLOOD PRESSURE: 106 MMHG | RESPIRATION RATE: 16 BRPM

## 2017-08-09 DIAGNOSIS — G44.209 MIXED MIGRAINE AND MUSCLE CONTRACTION HEADACHE: Primary | ICD-10-CM

## 2017-08-09 DIAGNOSIS — G43.909 MIXED MIGRAINE AND MUSCLE CONTRACTION HEADACHE: Primary | ICD-10-CM

## 2017-08-09 PROCEDURE — 99213 OFFICE O/P EST LOW 20 MIN: CPT | Performed by: OTHER

## 2017-08-09 RX ORDER — ELETRIPTAN HYDROBROMIDE 40 MG/1
TABLET, FILM COATED ORAL
Refills: 0 | COMMUNITY
Start: 2017-07-31 | End: 2017-08-09

## 2017-08-09 RX ORDER — ELETRIPTAN HYDROBROMIDE 40 MG/1
40 TABLET, FILM COATED ORAL AS NEEDED
Qty: 12 TABLET | Refills: 2 | Status: SHIPPED | OUTPATIENT
Start: 2017-08-09 | End: 2018-07-02

## 2017-08-09 NOTE — PATIENT INSTRUCTIONS
Refill policies:    • Allow 2-3 business days for refills; controlled substances may take longer.   • Contact your pharmacy at least 5 days prior to running out of medication and have them send an electronic request or submit request through the [facility] have a procedure or additional testing performed. [facility]) will contact your insurance carrier to obtain pre-certification or prior authorization.     Unfortunately, [facility] has seen an increase in denial of payment even though the p

## 2017-08-09 NOTE — PROGRESS NOTES
HPI:    Patient ID: Suzie Bolivar is a 33year old female. HPI   Patient is here for follow up for migraines and medication check.  She reports headaches have been better since last visit but has had couple migraine due to humid/rainy weather but they h Stroke Mother    • Depression Mother    • High Cholesterol Maternal Grandfather    • Stroke Maternal Grandfather    • Hypertension Maternal Grandfather    • Substance Abuse Father       Smoking status: Current Every Day Smoker Rfl: 6   Albuterol Sulfate  (90 BASE) MCG/ACT Inhalation Aero Soln Inhale 2 puffs into the lungs every 4 (four) hours as needed for Wheezing or Shortness of Breath.  Disp: 1 Inhaler Rfl: 2   Norethindrone 0.35 MG Oral Tab Take one tablet daily Disp: and steady. ASSESSMENT/PLAN:   Mixed migraine and muscle contraction headache  (primary encounter diagnosis)    Headaches well controlled on current regime   Continue current medications.     Follow up in 5-6 months  No orders of the defined type

## 2017-08-09 NOTE — PROGRESS NOTES
Pt here for follow up for migraines, and a medication check. Pt reports that the weather is affecting her migraines, but they have been \"manageable. \"

## 2017-09-10 ENCOUNTER — HOSPITAL ENCOUNTER (OUTPATIENT)
Age: 32
Discharge: HOME | End: 2017-09-10
Attending: FAMILY MEDICINE
Payer: COMMERCIAL

## 2017-09-10 VITALS
DIASTOLIC BLOOD PRESSURE: 75 MMHG | RESPIRATION RATE: 16 BRPM | SYSTOLIC BLOOD PRESSURE: 131 MMHG | OXYGEN SATURATION: 100 % | HEART RATE: 90 BPM | TEMPERATURE: 98 F

## 2017-09-10 DIAGNOSIS — Z76.0 ENCOUNTER FOR MEDICATION REFILL: ICD-10-CM

## 2017-09-10 DIAGNOSIS — J45.20 MILD INTERMITTENT ASTHMA WITHOUT COMPLICATION: ICD-10-CM

## 2017-09-10 DIAGNOSIS — H69.83 DYSFUNCTION OF BOTH EUSTACHIAN TUBES: ICD-10-CM

## 2017-09-10 DIAGNOSIS — J02.9 ACUTE PHARYNGITIS, UNSPECIFIED ETIOLOGY: ICD-10-CM

## 2017-09-10 DIAGNOSIS — J30.2 ACUTE SEASONAL ALLERGIC RHINITIS, UNSPECIFIED TRIGGER: Primary | ICD-10-CM

## 2017-09-10 LAB — POCT RAPID STREP: NEGATIVE

## 2017-09-10 PROCEDURE — 99214 OFFICE O/P EST MOD 30 MIN: CPT

## 2017-09-10 PROCEDURE — 87081 CULTURE SCREEN ONLY: CPT | Performed by: FAMILY MEDICINE

## 2017-09-10 PROCEDURE — 87430 STREP A AG IA: CPT | Performed by: FAMILY MEDICINE

## 2017-09-10 RX ORDER — PREDNISONE 20 MG/1
TABLET ORAL
Qty: 10 TABLET | Refills: 0 | Status: SHIPPED | OUTPATIENT
Start: 2017-09-10 | End: 2017-10-07 | Stop reason: ALTCHOICE

## 2017-09-10 RX ORDER — FLUTICASONE PROPIONATE 50 MCG
SPRAY, SUSPENSION (ML) NASAL
Qty: 1 INHALER | Refills: 0 | Status: SHIPPED | OUTPATIENT
Start: 2017-09-10 | End: 2018-08-13 | Stop reason: ALTCHOICE

## 2017-09-10 RX ORDER — ALBUTEROL SULFATE 90 UG/1
2 AEROSOL, METERED RESPIRATORY (INHALATION) EVERY 4 HOURS PRN
Qty: 1 INHALER | Refills: 0 | Status: SHIPPED | OUTPATIENT
Start: 2017-09-10 | End: 2020-04-17 | Stop reason: ALTCHOICE

## 2017-09-10 NOTE — ED PROVIDER NOTES
Patient Seen in: [facility]    History   Patient presents with:  Cough/URI    Stated Complaint: sore throat, itchy ears, rt side of face swollen, cough, x 2 days    HPI    This 52-year-old female presents to the office with comp treatment  05/06/2011: ESOPH ENDOSCOPY W/US FN BX  10-4-14: MIRENA, IUD, 5 YEAR      Comment: AND REMOVAL  No date: MYRINGOTOMY, LASER-ASSISTED  1998: OTHER SURGICAL HISTORY Right      Comment: KNEE ARTHROSCOPY  No date: OTHER SURGICAL HISTORY      Comment noted.  EARS: Tympanic membranes normal color but both are retracted, EAC's normal.  NOSE: Turbinates markedly congested, no bleeding noted.   PHARYNX:  Cobblestoning is noted, no exudates seen, airway patent, uvula midline  NECK:  No cervical lymphadenopat days  If symptoms worsen      Medications Prescribed:  Current Discharge Medication List    START taking these medications    predniSONE 20 MG Oral Tab  Take 2 tablets once daily with breakfast for 5 days.   Qty: 10 tablet Refills: 0  Associated Diagnoses:A

## 2017-09-10 NOTE — ED INITIAL ASSESSMENT (HPI)
Pt presents to the immediate care due to right eyelid swelling, right sided facial swelling, ear pain (R>L), and sore throat. Pt states she started with what she thought was allergy symptoms 5 days ago and 3 days ago felt like she was getting ill.  Hyacinth benites

## 2017-10-07 ENCOUNTER — HOSPITAL ENCOUNTER (OUTPATIENT)
Age: 32
Discharge: HOME | End: 2017-10-07
Attending: FAMILY MEDICINE
Payer: COMMERCIAL

## 2017-10-07 VITALS
OXYGEN SATURATION: 100 % | SYSTOLIC BLOOD PRESSURE: 148 MMHG | HEART RATE: 70 BPM | TEMPERATURE: 98 F | DIASTOLIC BLOOD PRESSURE: 92 MMHG | RESPIRATION RATE: 20 BRPM

## 2017-10-07 DIAGNOSIS — J06.9 ACUTE URI: ICD-10-CM

## 2017-10-07 DIAGNOSIS — J20.9 ACUTE BRONCHITIS, UNSPECIFIED ORGANISM: Primary | ICD-10-CM

## 2017-10-07 PROCEDURE — 99214 OFFICE O/P EST MOD 30 MIN: CPT

## 2017-10-07 PROCEDURE — 99213 OFFICE O/P EST LOW 20 MIN: CPT

## 2017-10-07 RX ORDER — METHYLPREDNISOLONE 4 MG/1
TABLET ORAL
Qty: 1 PACKAGE | Refills: 0 | Status: SHIPPED | OUTPATIENT
Start: 2017-10-07 | End: 2018-01-29

## 2017-10-07 NOTE — ED PROVIDER NOTES
Patient Seen in: THE [facility] [place]    History   Patient presents with:  Cough/URI    Stated Complaint: COUGH/SOB/TIGHTNESS IN CHEST X2-3 DAYS    HPI    Kyle Vogel is a 33year old female presents with complaint of sinus pressure, p Comment: left eye tear duct  No date: OTHER SURGICAL HISTORY      Comment: nasal polyp removal, turbinet surgery  No date: TONSILLECTOMY    Family History   Problem Relation Age of Onset   • Psychiatric Maternal Grandmother      ALZHEIMER   • Diabetes Mate display    ============================================================  ED Course  ------------------------------------------------------------  MDM   Patient with the possible viral bronchitis and bronchospasm or allergies.   We will give prednisone for a

## 2017-10-07 NOTE — ED INITIAL ASSESSMENT (HPI)
Pt c/o sinus pressure, ear tickle, post nasal drip and cough. States having bronchospasm type cough.   (Currently on Vancomycin for c-diff following prescription of Augmentin for sinusitis)

## 2018-01-29 DIAGNOSIS — G44.209 MIXED MIGRAINE AND MUSCLE CONTRACTION HEADACHE: Primary | ICD-10-CM

## 2018-01-29 DIAGNOSIS — G43.909 MIXED MIGRAINE AND MUSCLE CONTRACTION HEADACHE: Primary | ICD-10-CM

## 2018-01-29 NOTE — TELEPHONE ENCOUNTER
Medication: Topamax     Date of last refill: 5/15/17  [place]    Last office visit: 8/9/2017  Due back to clinic per last office note: 5-6 months   Date next office visit scheduled:  Future Appointments  Date Time Pro

## 2018-01-30 RX ORDER — TOPIRAMATE 100 MG/1
TABLET, FILM COATED ORAL
Qty: 90 TABLET | Refills: 0 | Status: SHIPPED | OUTPATIENT
Start: 2018-01-30 | End: 2018-06-11

## 2018-04-01 DIAGNOSIS — G44.209 MIXED MIGRAINE AND MUSCLE CONTRACTION HEADACHE: ICD-10-CM

## 2018-04-01 DIAGNOSIS — G43.909 MIXED MIGRAINE AND MUSCLE CONTRACTION HEADACHE: ICD-10-CM

## 2018-04-02 DIAGNOSIS — Z86.69 HISTORY OF MIGRAINE HEADACHES: ICD-10-CM

## 2018-04-02 DIAGNOSIS — G43.709 CHRONIC MIGRAINE WITHOUT AURA WITHOUT STATUS MIGRAINOSUS, NOT INTRACTABLE: ICD-10-CM

## 2018-04-02 NOTE — TELEPHONE ENCOUNTER
Sent patient a [employer] message to please make appointment      Medication: Topiramate 100mg     Date of last refill: 1/30/18  [place]     Last office visit: 8/9/17  Due back to clinic per last office note:  5-6 months  D

## 2018-04-02 NOTE — TELEPHONE ENCOUNTER
Sent patient a [employer] message to please make appointment      Medication: Topiramate 100mg and Eletriptan 40mg      Date of last refill: 1/30/18 and 8/9/17  [place]      Last office visit: 8/9/17  Due back to clinic per

## 2018-04-04 ENCOUNTER — TELEPHONE (OUTPATIENT)
Dept: NEUROLOGY | Facility: CLINIC | Age: 33
End: 2018-04-04

## 2018-04-04 RX ORDER — TOPIRAMATE 100 MG/1
TABLET, FILM COATED ORAL
Qty: 60 TABLET | Refills: 1 | Status: SHIPPED | OUTPATIENT
Start: 2018-04-04 | End: 2018-05-22

## 2018-04-04 RX ORDER — ELETRIPTAN HYDROBROMIDE 40 MG/1
TABLET, FILM COATED ORAL
Qty: 12 TABLET | Refills: 1 | Status: SHIPPED | OUTPATIENT
Start: 2018-04-04 | End: 2018-07-02

## 2018-04-04 NOTE — TELEPHONE ENCOUNTER
Patient upset no one contacted her regarding needing an appointment. Informed patient that a [employer] message was sent to her. 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  Dr Wilmar Dawson schedule not ye

## 2018-04-10 NOTE — TELEPHONE ENCOUNTER
Spoke to patient and she informed me that she can't make appointment for June because Dr. Maura August schedule is not open yet. Patient also stated that she was informed by another nurse that she has enough to get her to June.   I informed patient to please

## 2018-04-11 RX ORDER — TOPIRAMATE 100 MG/1
TABLET, FILM COATED ORAL
Qty: 90 TABLET | Refills: 0 | OUTPATIENT
Start: 2018-04-11

## 2018-05-19 ENCOUNTER — LAB ENCOUNTER (OUTPATIENT)
Dept: LAB | Facility: HOSPITAL | Age: 33
End: 2018-05-19
Attending: PHYSICIAN ASSISTANT
Payer: COMMERCIAL

## 2018-05-19 DIAGNOSIS — R19.7 DIARRHEA, UNSPECIFIED TYPE: ICD-10-CM

## 2018-05-19 DIAGNOSIS — D50.9 IRON DEFICIENCY ANEMIA, UNSPECIFIED IRON DEFICIENCY ANEMIA TYPE: ICD-10-CM

## 2018-05-19 DIAGNOSIS — R63.4 WEIGHT LOSS: ICD-10-CM

## 2018-05-19 DIAGNOSIS — E53.8 VITAMIN B12 DEFICIENCY: ICD-10-CM

## 2018-05-19 DIAGNOSIS — R10.84 ABDOMINAL PAIN, GENERALIZED: ICD-10-CM

## 2018-05-19 DIAGNOSIS — R53.83 FATIGUE, UNSPECIFIED TYPE: ICD-10-CM

## 2018-05-19 DIAGNOSIS — E55.9 VITAMIN D DEFICIENCY: ICD-10-CM

## 2018-05-19 PROCEDURE — 36415 COLL VENOUS BLD VENIPUNCTURE: CPT

## 2018-05-19 PROCEDURE — 87493 C DIFF AMPLIFIED PROBE: CPT

## 2018-05-19 PROCEDURE — 85652 RBC SED RATE AUTOMATED: CPT

## 2018-05-19 PROCEDURE — 82746 ASSAY OF FOLIC ACID SERUM: CPT

## 2018-05-19 PROCEDURE — 85025 COMPLETE CBC W/AUTO DIFF WBC: CPT

## 2018-05-19 PROCEDURE — 82607 VITAMIN B-12: CPT

## 2018-05-19 PROCEDURE — 84443 ASSAY THYROID STIM HORMONE: CPT

## 2018-05-19 PROCEDURE — 82728 ASSAY OF FERRITIN: CPT

## 2018-05-19 PROCEDURE — 82784 ASSAY IGA/IGD/IGG/IGM EACH: CPT

## 2018-05-19 PROCEDURE — 83516 IMMUNOASSAY NONANTIBODY: CPT

## 2018-05-19 PROCEDURE — 83540 ASSAY OF IRON: CPT

## 2018-05-19 PROCEDURE — 80053 COMPREHEN METABOLIC PANEL: CPT

## 2018-05-19 PROCEDURE — 86140 C-REACTIVE PROTEIN: CPT

## 2018-05-19 PROCEDURE — 84439 ASSAY OF FREE THYROXINE: CPT

## 2018-05-19 PROCEDURE — 83550 IRON BINDING TEST: CPT

## 2018-06-11 DIAGNOSIS — G44.209 MIXED MIGRAINE AND MUSCLE CONTRACTION HEADACHE: ICD-10-CM

## 2018-06-11 DIAGNOSIS — G43.909 MIXED MIGRAINE AND MUSCLE CONTRACTION HEADACHE: ICD-10-CM

## 2018-06-11 NOTE — TELEPHONE ENCOUNTER
Medication: Topamax 100 mg     Date of last refill: 1/30/18  [place]    Last office visit: 5-6 months   Due back to clinic per last office note: 5-6 months   Date next office visit scheduled:  Future Appointments  Ad

## 2018-06-12 RX ORDER — TOPIRAMATE 100 MG/1
TABLET, FILM COATED ORAL
Qty: 75 TABLET | Refills: 0 | Status: SHIPPED | OUTPATIENT
Start: 2018-06-12 | End: 2018-07-02

## 2018-07-02 ENCOUNTER — OFFICE VISIT (OUTPATIENT)
Dept: NEUROLOGY | Facility: CLINIC | Age: 33
End: 2018-07-02

## 2018-07-02 VITALS
HEART RATE: 76 BPM | RESPIRATION RATE: 16 BRPM | SYSTOLIC BLOOD PRESSURE: 126 MMHG | DIASTOLIC BLOOD PRESSURE: 88 MMHG | BODY MASS INDEX: 32.58 KG/M2 | WEIGHT: 215 LBS | HEIGHT: 68 IN

## 2018-07-02 DIAGNOSIS — G44.209 MIXED MIGRAINE AND MUSCLE CONTRACTION HEADACHE: ICD-10-CM

## 2018-07-02 DIAGNOSIS — G43.909 MIXED MIGRAINE AND MUSCLE CONTRACTION HEADACHE: ICD-10-CM

## 2018-07-02 PROCEDURE — 99213 OFFICE O/P EST LOW 20 MIN: CPT | Performed by: OTHER

## 2018-07-02 RX ORDER — TOPIRAMATE 100 MG/1
TABLET, FILM COATED ORAL
Qty: 75 TABLET | Refills: 2 | Status: SHIPPED | OUTPATIENT
Start: 2018-07-02 | End: 2018-11-08

## 2018-07-02 RX ORDER — ELETRIPTAN HYDROBROMIDE 40 MG/1
40 TABLET, FILM COATED ORAL AS NEEDED
Qty: 12 TABLET | Refills: 2 | Status: SHIPPED | OUTPATIENT
Start: 2018-07-02 | End: 2018-08-13

## 2018-07-02 RX ORDER — BUTALBITAL, ACETAMINOPHEN AND CAFFEINE 50; 325; 40 MG/1; MG/1; MG/1
1 TABLET ORAL EVERY 12 HOURS PRN
Qty: 60 TABLET | Refills: 1 | Status: SHIPPED | OUTPATIENT
Start: 2018-07-02 | End: 2018-12-31

## 2018-07-02 NOTE — PATIENT INSTRUCTIONS
Refill policies:    • Allow 2-3 business days for refills; controlled substances may take longer.   • Contact your pharmacy at least 5 days prior to running out of medication and have them send an electronic request or submit request through the “request re entire amount billed. Precertification and Prior Authorizations: If your physician has recommended that you have a procedure or additional testing performed.   [facility]) will contact your insurance carrier to obtain pre-certi

## 2018-07-02 NOTE — PROGRESS NOTES
HPI:    Patient ID: Rosana Narvaez is a 29year old female. HPI     Patient is here for follow up for migraines and medication check. Headaches remains stable but having issues with Relpax.  States pharmacy gave her generic Relpax which does not work as Onset   • Psychiatric Maternal Grandmother      ALZHEIMER   • Diabetes Maternal Grandmother    • Stroke Maternal Grandmother    • Bleeding Disorders Maternal Grandmother    • High Cholesterol Mother    • Diabetes Mother    • Stroke Mother    • Depression M MONTELUKAST SODIUM 10 MG Oral Tab TAKE 1 TABLET(10 MG) BY MOUTH EVERY NIGHT Disp: 30 tablet Rfl: 0   lurasidone HCl (LATUDA) 80 MG Oral Tab TAKE 1 TABLET BY MOUTH DAILY WITH DINNER Disp: 90 tablet Rfl: 0   LORazepam 0.5 MG Oral Tab Take 1-2 tab by mouth affect. NEUROLOGICAL: This patient is alert and orientated x 3. Speech is fluent with intact comprehension. Pupils equally round and reactive to light. 3+ brisk bilaterally. EOMs intact. Visual fields are full. Face is symmetrical. Tongue is midline.  Uvu

## 2018-07-03 ENCOUNTER — TELEPHONE (OUTPATIENT)
Dept: NEUROLOGY | Facility: CLINIC | Age: 33
End: 2018-07-03

## 2018-07-03 DIAGNOSIS — G43.709 CHRONIC MIGRAINE WITHOUT AURA WITHOUT STATUS MIGRAINOSUS, NOT INTRACTABLE: ICD-10-CM

## 2018-07-05 RX ORDER — ELETRIPTAN HYDROBROMIDE 40 MG/1
TABLET, FILM COATED ORAL
Qty: 12 TABLET | Refills: 2 | Status: SHIPPED | OUTPATIENT
Start: 2018-07-05 | End: 2019-08-26

## 2018-07-05 NOTE — TELEPHONE ENCOUNTER
Per Dr Tariq Porter:  Epimenio Keyla we have any co-pay card or any program that patient can be eligible for ? Brand Relpax works better for her than generic Relpax. LMTCB on unidentified voicemail. Relpax RX card available for patient to  at .  She may

## 2018-07-05 NOTE — TELEPHONE ENCOUNTER
Patient returned call. States with the current storms they are triggering her migraines and she is down to just one Relpax. Patient requesting generic form be ordered to give her some relief until she receives the card.  Card placed in the mail per patient

## 2018-07-06 NOTE — TELEPHONE ENCOUNTER
Pt called back having not listened to VM left. Relayed message from [employer]. No further questions.

## 2018-07-06 NOTE — TELEPHONE ENCOUNTER
LMTCB with patient. Generic Relpax sent to pharmacy by Dr. Jared Queen. Also active on [employer], informed pt on VM that we would send message to her. Encouraged pt to contact office with additional concerns/questions.

## 2018-07-10 ENCOUNTER — HOSPITAL ENCOUNTER (EMERGENCY)
Facility: HOSPITAL | Age: 33
Discharge: HOME | End: 2018-07-10
Payer: COMMERCIAL

## 2018-07-10 ENCOUNTER — APPOINTMENT (OUTPATIENT)
Dept: CT IMAGING | Facility: HOSPITAL | Age: 33
End: 2018-07-10
Payer: COMMERCIAL

## 2018-07-10 VITALS
OXYGEN SATURATION: 98 % | HEIGHT: 68 IN | WEIGHT: 215 LBS | SYSTOLIC BLOOD PRESSURE: 127 MMHG | DIASTOLIC BLOOD PRESSURE: 88 MMHG | RESPIRATION RATE: 21 BRPM | TEMPERATURE: 98 F | HEART RATE: 45 BPM | BODY MASS INDEX: 32.58 KG/M2

## 2018-07-10 DIAGNOSIS — R00.2 PALPITATIONS: ICD-10-CM

## 2018-07-10 DIAGNOSIS — T50.905A MEDICATION SIDE EFFECT, INITIAL ENCOUNTER: ICD-10-CM

## 2018-07-10 DIAGNOSIS — R51.9 ACUTE NONINTRACTABLE HEADACHE, UNSPECIFIED HEADACHE TYPE: Primary | ICD-10-CM

## 2018-07-10 LAB
ATRIAL RATE: 44 BPM
ATRIAL RATE: 47 BPM
BUN BLD-MCNC: 4 MG/DL (ref 8–20)
CALCIUM BLD-MCNC: 8.6 MG/DL (ref 8.3–10.3)
CHLORIDE: 113 MMOL/L (ref 101–111)
CO2: 21 MMOL/L (ref 22–32)
CREAT BLD-MCNC: 0.8 MG/DL (ref 0.55–1.02)
GLUCOSE BLD-MCNC: 114 MG/DL (ref 70–99)
P AXIS: 20 DEGREES
P AXIS: 29 DEGREES
P-R INTERVAL: 148 MS
P-R INTERVAL: 152 MS
POTASSIUM SERPL-SCNC: 3.2 MMOL/L (ref 3.6–5.1)
Q-T INTERVAL: 468 MS
Q-T INTERVAL: 488 MS
QRS DURATION: 92 MS
QRS DURATION: 92 MS
QTC CALCULATION (BEZET): 414 MS
QTC CALCULATION (BEZET): 417 MS
R AXIS: 54 DEGREES
R AXIS: 56 DEGREES
SODIUM SERPL-SCNC: 143 MMOL/L (ref 136–144)
T AXIS: 44 DEGREES
T AXIS: 58 DEGREES
TROPONIN: <0.046 NG/ML (ref ?–0.05)
VENTRICULAR RATE: 44 BPM
VENTRICULAR RATE: 47 BPM

## 2018-07-10 PROCEDURE — 96375 TX/PRO/DX INJ NEW DRUG ADDON: CPT

## 2018-07-10 PROCEDURE — 80048 BASIC METABOLIC PNL TOTAL CA: CPT

## 2018-07-10 PROCEDURE — 93010 ELECTROCARDIOGRAM REPORT: CPT

## 2018-07-10 PROCEDURE — 70450 CT HEAD/BRAIN W/O DYE: CPT

## 2018-07-10 PROCEDURE — 99285 EMERGENCY DEPT VISIT HI MDM: CPT

## 2018-07-10 PROCEDURE — 96361 HYDRATE IV INFUSION ADD-ON: CPT

## 2018-07-10 PROCEDURE — 96374 THER/PROPH/DIAG INJ IV PUSH: CPT

## 2018-07-10 PROCEDURE — 84484 ASSAY OF TROPONIN QUANT: CPT

## 2018-07-10 PROCEDURE — 93005 ELECTROCARDIOGRAM TRACING: CPT

## 2018-07-10 RX ORDER — KETOROLAC TROMETHAMINE 30 MG/ML
10 INJECTION, SOLUTION INTRAMUSCULAR; INTRAVENOUS ONCE
Status: COMPLETED | OUTPATIENT
Start: 2018-07-10 | End: 2018-07-10

## 2018-07-10 RX ORDER — SODIUM CHLORIDE 9 MG/ML
INJECTION, SOLUTION INTRAVENOUS ONCE
Status: DISCONTINUED | OUTPATIENT
Start: 2018-07-10 | End: 2018-07-10

## 2018-07-10 RX ORDER — SODIUM CHLORIDE 9 MG/ML
1000 INJECTION, SOLUTION INTRAVENOUS ONCE
Status: COMPLETED | OUTPATIENT
Start: 2018-07-10 | End: 2018-07-10

## 2018-07-10 RX ORDER — DEXAMETHASONE SODIUM PHOSPHATE 4 MG/ML
10 VIAL (ML) INJECTION ONCE
Status: COMPLETED | OUTPATIENT
Start: 2018-07-10 | End: 2018-07-10

## 2018-07-10 RX ORDER — ONDANSETRON 4 MG/1
4 TABLET, ORALLY DISINTEGRATING ORAL EVERY 6 HOURS PRN
Qty: 10 TABLET | Refills: 0 | Status: SHIPPED | OUTPATIENT
Start: 2018-07-10 | End: 2018-07-17

## 2018-07-10 RX ORDER — METOCLOPRAMIDE HYDROCHLORIDE 5 MG/ML
5 INJECTION INTRAMUSCULAR; INTRAVENOUS ONCE
Status: COMPLETED | OUTPATIENT
Start: 2018-07-10 | End: 2018-07-10

## 2018-07-10 RX ORDER — POTASSIUM CHLORIDE 20 MEQ/1
40 TABLET, EXTENDED RELEASE ORAL ONCE
Status: COMPLETED | OUTPATIENT
Start: 2018-07-10 | End: 2018-07-10

## 2018-07-10 RX ORDER — DIPHENHYDRAMINE HYDROCHLORIDE 50 MG/ML
25 INJECTION INTRAMUSCULAR; INTRAVENOUS ONCE
Status: COMPLETED | OUTPATIENT
Start: 2018-07-10 | End: 2018-07-10

## 2018-07-10 RX ORDER — POTASSIUM CHLORIDE 750 MG/1
10 TABLET, EXTENDED RELEASE ORAL DAILY
Qty: 7 TABLET | Refills: 0 | Status: SHIPPED | OUTPATIENT
Start: 2018-07-10 | End: 2018-07-17

## 2018-07-10 NOTE — ED PROVIDER NOTES
Patient Seen in: [facility] Emergency Department    History   Patient presents with:  Headache (neurologic)    Stated Complaint: HEADACHE    HPI    Pleasant female presents with headache, come on over the last day, it is worse overnight tonight to w unspecified    • Fibromyalgia    • Gallstone    • H/O bronchitis 2015    GETS IT YEARLY   • Migraines    • Moderate dysplasia of cervix 2006    COLPO   • MTHFR (methylene THF reductase) deficiency and homocystinuria (HCC)    • Obesity, unspecified    • Pro Well developed, well nourished, alert       Head: Normocephalic and atraumatic. Sclera anicteric, conjunctiva pink and moist.   PERRL, EOMI      Mucus membranes pink and moist,   no stridor or trismus    Neck: Supple with normal range of motion.   Casilda Koyanagi INDICATIONS:  HEADACHE  TECHNIQUE:  Noncontrast CT scanning is performed through the brain. Dose reduction techniques were used.  Dose information is transmitted to the [facility] ([address] [place] [address] of Radiology) Erinn Baca [address] ([address]) which includ discharge instuctions were discussed given the patient's ER course. We discussed signs and symptoms that should prompt the patient's immediate return to the emergency department.    Reasonable over the counter and prescription treatment options and [facility]

## 2018-07-10 NOTE — ED INITIAL ASSESSMENT (HPI)
Pt reports headache staring around 2000 last noc. Recently came off Wellbutrin, last dose Friday morning, took 150mg this morning. +lights bothering eyes as well.

## 2018-07-10 NOTE — ED NOTES
Pt ambulated in room with monitor on. Heart rate did not go over 66, no ectopic beats noted. Pt asymptomatic. Ok to be discharged.

## 2018-08-04 ENCOUNTER — HOSPITAL ENCOUNTER (OUTPATIENT)
Age: 33
Discharge: HOME | End: 2018-08-04
Payer: COMMERCIAL

## 2018-08-04 ENCOUNTER — APPOINTMENT (OUTPATIENT)
Dept: CT IMAGING | Age: 33
End: 2018-08-04
Attending: PHYSICIAN ASSISTANT
Payer: COMMERCIAL

## 2018-08-04 ENCOUNTER — APPOINTMENT (OUTPATIENT)
Dept: GENERAL RADIOLOGY | Age: 33
End: 2018-08-04
Attending: PHYSICIAN ASSISTANT
Payer: COMMERCIAL

## 2018-08-04 VITALS
SYSTOLIC BLOOD PRESSURE: 109 MMHG | OXYGEN SATURATION: 100 % | TEMPERATURE: 98 F | RESPIRATION RATE: 16 BRPM | HEART RATE: 51 BPM | DIASTOLIC BLOOD PRESSURE: 53 MMHG

## 2018-08-04 DIAGNOSIS — S40.021A ARM CONTUSION, RIGHT, INITIAL ENCOUNTER: ICD-10-CM

## 2018-08-04 DIAGNOSIS — S83.411A SPRAIN OF MEDIAL COLLATERAL LIGAMENT OF RIGHT KNEE, INITIAL ENCOUNTER: ICD-10-CM

## 2018-08-04 DIAGNOSIS — S39.012A STRAIN OF LUMBAR REGION, INITIAL ENCOUNTER: Primary | ICD-10-CM

## 2018-08-04 LAB — POCT URINE PREGNANCY: NEGATIVE

## 2018-08-04 PROCEDURE — 73560 X-RAY EXAM OF KNEE 1 OR 2: CPT | Performed by: PHYSICIAN ASSISTANT

## 2018-08-04 PROCEDURE — 72110 X-RAY EXAM L-2 SPINE 4/>VWS: CPT | Performed by: PHYSICIAN ASSISTANT

## 2018-08-04 PROCEDURE — 81025 URINE PREGNANCY TEST: CPT | Performed by: PHYSICIAN ASSISTANT

## 2018-08-04 PROCEDURE — 99214 OFFICE O/P EST MOD 30 MIN: CPT

## 2018-08-04 PROCEDURE — 99215 OFFICE O/P EST HI 40 MIN: CPT

## 2018-08-04 PROCEDURE — 72131 CT LUMBAR SPINE W/O DYE: CPT | Performed by: PHYSICIAN ASSISTANT

## 2018-08-04 RX ORDER — ACETAMINOPHEN 325 MG/1
650 TABLET ORAL ONCE
Status: COMPLETED | OUTPATIENT
Start: 2018-08-04 | End: 2018-08-04

## 2018-08-04 NOTE — ED PROVIDER NOTES
Patient Seen in: [facility]    History   Patient presents with:  Fall (musculoskeletal, neurologic)    Stated Complaint: TPL - BODY ACHES/FELL    HPI    CHIEF COMPLAINT: Multiple injury status post fall    HISTORY OF PRESENT ILL WOKE UP DURING A PROCEDURE FOR MIGRAINES   • Anxiety state, unspecified    • Asthma    • Cervical high risk human papillomavirus (HPV) DNA test positive    • Chronic rhinitis    • DEPRESSION    • Esophageal reflux    • Extrinsic asthma, unspecified    • F O2 Device: None (Room air)    Current:/53   Pulse 51   Temp 98 °F (36.7 °C) (Oral)   Resp 16   LMP 07/23/2018 (Approximate)   SpO2 100%         Physical Exam  Nursing notes and vital signs reviewed       General Appearance: Alert oriented x4 no acute Right knee: No edema, ecchymosis or obvious signs of dislocation or fracture. No erythema, open wound. The quadriceps and patellar tendons are intact. Patella is with normal lie, intact.   Tenderness noted along the medial joint line with moderate tender 1455: Patient x-ray results showed a questionable L2 transverse process fracture, patient is tender at this point patient will be sent over CT for CT of the lumbar spine. I discussed the radiology results with the patient.  I discussed the diagnosis

## 2018-08-04 NOTE — ED INITIAL ASSESSMENT (HPI)
Patient presents with cc of fall at [facility] yesterday on a wet floor s/p mopping. Did the splits injury and injured right upper on bag turnstyle and inner right knee. No head injury of LOC.

## 2018-08-30 ENCOUNTER — HOSPITAL ENCOUNTER (OUTPATIENT)
Dept: MRI IMAGING | Age: 33
Discharge: HOME | End: 2018-08-30
Attending: ORTHOPAEDIC SURGERY
Payer: COMMERCIAL

## 2018-08-30 DIAGNOSIS — S83.411A SPRAIN OF MEDIAL COLLATERAL LIGAMENT OF RIGHT KNEE, INITIAL ENCOUNTER: ICD-10-CM

## 2018-08-30 PROCEDURE — 73721 MRI JNT OF LWR EXTRE W/O DYE: CPT | Performed by: ORTHOPAEDIC SURGERY

## 2018-08-30 NOTE — PROGRESS NOTES
Muscle strain in back of knee. No specific treatment needed. Can do physical therapy.  Takes 4-6 weeks to fully improve

## 2018-10-22 ENCOUNTER — TELEPHONE (OUTPATIENT)
Dept: NEUROLOGY | Facility: CLINIC | Age: 33
End: 2018-10-22

## 2018-10-22 NOTE — TELEPHONE ENCOUNTER
Received request for generic authorization from [employer] for Relpax 40 mg. Informed pharmacy that last refill from [employer] was on 07/05/2018 for Eletriptan Hydrobromide 40 mg. Employee states she will investigate and let us know what is needed.

## 2018-11-08 DIAGNOSIS — G44.209 MIXED MIGRAINE AND MUSCLE CONTRACTION HEADACHE: ICD-10-CM

## 2018-11-08 DIAGNOSIS — G43.909 MIXED MIGRAINE AND MUSCLE CONTRACTION HEADACHE: ICD-10-CM

## 2018-11-09 RX ORDER — TOPIRAMATE 100 MG/1
TABLET, FILM COATED ORAL
Qty: 75 TABLET | Refills: 0 | Status: SHIPPED | OUTPATIENT
Start: 2018-11-09 | End: 2018-12-07

## 2018-12-07 DIAGNOSIS — G43.909 MIXED MIGRAINE AND MUSCLE CONTRACTION HEADACHE: ICD-10-CM

## 2018-12-07 DIAGNOSIS — G44.209 MIXED MIGRAINE AND MUSCLE CONTRACTION HEADACHE: ICD-10-CM

## 2018-12-11 RX ORDER — TOPIRAMATE 100 MG/1
TABLET, FILM COATED ORAL
Qty: 75 TABLET | Refills: 1 | Status: SHIPPED | OUTPATIENT
Start: 2018-12-11 | End: 2019-03-12

## 2018-12-25 ENCOUNTER — HOSPITAL ENCOUNTER (OUTPATIENT)
Age: 33
Discharge: HOME | End: 2018-12-25
Payer: COMMERCIAL

## 2018-12-25 VITALS
HEART RATE: 86 BPM | OXYGEN SATURATION: 100 % | DIASTOLIC BLOOD PRESSURE: 78 MMHG | RESPIRATION RATE: 18 BRPM | SYSTOLIC BLOOD PRESSURE: 134 MMHG | TEMPERATURE: 99 F

## 2018-12-25 DIAGNOSIS — H00.033 CELLULITIS OF RIGHT EYELID: Primary | ICD-10-CM

## 2018-12-25 PROCEDURE — 99214 OFFICE O/P EST MOD 30 MIN: CPT

## 2018-12-25 PROCEDURE — 99213 OFFICE O/P EST LOW 20 MIN: CPT

## 2018-12-25 RX ORDER — CEPHALEXIN 500 MG/1
500 CAPSULE ORAL 3 TIMES DAILY
Qty: 15 CAPSULE | Refills: 0 | Status: SHIPPED | OUTPATIENT
Start: 2018-12-25 | End: 2018-12-30

## 2018-12-25 NOTE — ED PROVIDER NOTES
Patient Seen in: [facility]    History   Patient presents with:   Eye Visual Problem (opthalmic)    Stated Complaint: HAS A STYE IN EYE AND ITS GETTING WORSE, SWELLING GOING INTO SHEEK    28-year-old female presents today with in COLPOSCOPY,BX CERVIX/ENDOCERV CURR  2009, 2006    Has had 2-moderate dysplasia, no treatment   • ENDOSCOPY, BOWEL POUCH, BIOPSY  05/24/2018    Christopher/gastritis   • ESOPH ENDOSCOPY W/US FN BX  05/06/2011   • LAPAROSCOPIC CHOLECYSTECTOMY N/A 5/22/2017 the right upper eyelid. Does have some mild swelling to the area or periorbital region. Has pain with palpation of the over the upper eyelid but no pain to the inferior periorbital region. Neck: Normal range of motion. Neck supple.    Cardiovascular: No

## 2018-12-25 NOTE — ED INITIAL ASSESSMENT (HPI)
Saturday 12/22 noticed a bump to the outer upper  corner of the eyelid. States that on Sunday she developed upper eye lid swelling. Patient was seen at [facility] immediate care yesterday and was prescribed antibiotic eye drops.  Patient states that today she

## 2018-12-26 ENCOUNTER — HOSPITAL ENCOUNTER (EMERGENCY)
Age: 33
Discharge: HOME | End: 2018-12-26
Attending: EMERGENCY MEDICINE
Payer: COMMERCIAL

## 2018-12-26 VITALS
OXYGEN SATURATION: 100 % | HEART RATE: 85 BPM | SYSTOLIC BLOOD PRESSURE: 129 MMHG | BODY MASS INDEX: 31.83 KG/M2 | DIASTOLIC BLOOD PRESSURE: 88 MMHG | HEIGHT: 68 IN | WEIGHT: 210 LBS | TEMPERATURE: 98 F | RESPIRATION RATE: 18 BRPM

## 2018-12-26 DIAGNOSIS — L03.213 PRESEPTAL CELLULITIS OF RIGHT EYE: Primary | ICD-10-CM

## 2018-12-26 PROCEDURE — 99283 EMERGENCY DEPT VISIT LOW MDM: CPT | Performed by: EMERGENCY MEDICINE

## 2018-12-26 RX ORDER — POLYMYXIN B SULFATE AND TRIMETHOPRIM 1; 10000 MG/ML; [USP'U]/ML
1 SOLUTION OPHTHALMIC
Qty: 10 ML | Refills: 0 | Status: SHIPPED | OUTPATIENT
Start: 2018-12-26 | End: 2018-12-31

## 2018-12-26 RX ORDER — CEFDINIR 300 MG/1
300 CAPSULE ORAL 2 TIMES DAILY
Qty: 20 CAPSULE | Refills: 0 | Status: SHIPPED | OUTPATIENT
Start: 2018-12-26 | End: 2019-01-05

## 2018-12-26 RX ORDER — TETRACAINE HYDROCHLORIDE 5 MG/ML
1 SOLUTION OPHTHALMIC ONCE
Status: COMPLETED | OUTPATIENT
Start: 2018-12-26 | End: 2018-12-26

## 2018-12-26 NOTE — ED PROVIDER NOTES
I reviewed that chart and discussed the case. I have examined the patient and noted right upper eyelid swelling slight erythema right lower lid swelling but no erythema no pain under consensual light exam or no pain under range of motion exercises. Gus Patrick

## 2018-12-26 NOTE — ED PROVIDER NOTES
Patient Seen in: THE [facility] Emergency Department In [place]    History   Patient presents with:  Eyelid Swelling    Stated Complaint: right eye swelling: on oral and optho abx    HPI  Patient is a 56-year-old female who presents with right upper and lower deficiency    • Walking pneumonia 2015       Past Surgical History:   Procedure Laterality Date   • ADENOIDECTOMY     • CHOLECYSTECTOMY  5/22/17   • COLONOSCOPY  05/06/2011    wnl per pt   • COLONOSCOPY     • COLONOSCOPY  05/24/2018    Christopher/TIFFANIE   • well-nourished. No distress.    HENT:     Right Eye Procedure  Applied 2 drops of tetracaine to the eye  Eyelids everted and swept    No foreign body seen   Applied fluorescein strip to the lower inner eyelid  No Corneal abrasion noted with woods lamp hours while awake for 5 days. Qty: 10 mL Refills: 0    cefdinir 300 MG Oral Cap  Take 1 capsule (300 mg total) by mouth 2 (two) times daily for 10 days.   Qty: 20 capsule Refills: 0

## 2018-12-26 NOTE — ED INITIAL ASSESSMENT (HPI)
Swelling to right eye since Saturday. Treated for stye and cellulitis. States swelling worse today, just started keflex last night, has had two doses so far.

## 2018-12-31 ENCOUNTER — OFFICE VISIT (OUTPATIENT)
Dept: NEUROLOGY | Facility: CLINIC | Age: 33
End: 2018-12-31
Payer: COMMERCIAL

## 2018-12-31 VITALS
BODY MASS INDEX: 31 KG/M2 | DIASTOLIC BLOOD PRESSURE: 70 MMHG | SYSTOLIC BLOOD PRESSURE: 118 MMHG | WEIGHT: 201 LBS | RESPIRATION RATE: 14 BRPM | HEART RATE: 72 BPM

## 2018-12-31 DIAGNOSIS — R51.9 MIXED HEADACHE: ICD-10-CM

## 2018-12-31 DIAGNOSIS — G43.709 CHRONIC MIGRAINE WITHOUT AURA WITHOUT STATUS MIGRAINOSUS, NOT INTRACTABLE: ICD-10-CM

## 2018-12-31 PROCEDURE — 99213 OFFICE O/P EST LOW 20 MIN: CPT | Performed by: OTHER

## 2018-12-31 RX ORDER — BUTALBITAL, ACETAMINOPHEN AND CAFFEINE 50; 325; 40 MG/1; MG/1; MG/1
1 TABLET ORAL EVERY 12 HOURS PRN
Qty: 60 TABLET | Refills: 1 | Status: SHIPPED | OUTPATIENT
Start: 2018-12-31 | End: 2020-04-17 | Stop reason: ALTCHOICE

## 2018-12-31 NOTE — PROGRESS NOTES
HPI:    Patient ID: Tammie Moctezuma is a 29year old female. Migraine         Patient is here for follow up for migraines and medication check. Headaches remains stable.  States regular Relpax does not work sometime hence she has to take Fioricet as back • TONSILLECTOMY        Family History   Problem Relation Age of Onset   • Psychiatric Maternal Grandmother         ALZHEIMER   • Diabetes Maternal Grandmother    • Stroke Maternal Grandmother    • Bleeding Disorders Maternal Grandmother    • High Cholest TABLETS) EVERY EVENING.  (Patient taking differently: TAKE 1 TABLET BY MOUTH EVERY MORNING AND TAKE 150 MG(1 1/2 TABLETS) EVERY EVENING. ) Disp: 75 tablet Rfl: 1   albuterol sulfate (2.5 MG/3ML) 0.083% Inhalation Nebu Soln Take 3 mL (2.5 mg total) by nebuli Comment:GI upset   PHYSICAL EXAM:   Physical Exam    Blood pressure 118/70, pulse 72, resp. rate 14, weight 201 lb, last menstrual period 12/06/2018, not currently breastfeeding.   General: A 29year old female in no apparent distress  HEENT Normocephalic a

## 2018-12-31 NOTE — PROGRESS NOTES
The patient is here for a follow-up for headaches. The patient states she noticed a decreased in headaches since her last office visit.

## 2019-03-12 DIAGNOSIS — G44.209 MIXED MIGRAINE AND MUSCLE CONTRACTION HEADACHE: ICD-10-CM

## 2019-03-12 DIAGNOSIS — G43.909 MIXED MIGRAINE AND MUSCLE CONTRACTION HEADACHE: ICD-10-CM

## 2019-03-13 RX ORDER — TOPIRAMATE 100 MG/1
TABLET, FILM COATED ORAL
Qty: 75 TABLET | Refills: 3 | Status: SHIPPED | OUTPATIENT
Start: 2019-03-13 | End: 2019-08-05

## 2019-06-04 PROCEDURE — 86480 TB TEST CELL IMMUN MEASURE: CPT | Performed by: FAMILY MEDICINE

## 2019-07-15 ENCOUNTER — OFFICE VISIT (OUTPATIENT)
Dept: NEUROLOGY | Facility: CLINIC | Age: 34
End: 2019-07-15
Payer: COMMERCIAL

## 2019-07-15 VITALS
BODY MASS INDEX: 34 KG/M2 | DIASTOLIC BLOOD PRESSURE: 68 MMHG | SYSTOLIC BLOOD PRESSURE: 110 MMHG | HEART RATE: 78 BPM | RESPIRATION RATE: 16 BRPM | WEIGHT: 225 LBS

## 2019-07-15 DIAGNOSIS — G43.709 CHRONIC MIGRAINE WITHOUT AURA WITHOUT STATUS MIGRAINOSUS, NOT INTRACTABLE: Primary | ICD-10-CM

## 2019-07-15 PROCEDURE — 99213 OFFICE O/P EST LOW 20 MIN: CPT | Performed by: OTHER

## 2019-07-15 RX ORDER — METHOCARBAMOL 500 MG/1
500 TABLET, FILM COATED ORAL 2 TIMES DAILY PRN
Qty: 60 TABLET | Refills: 0 | Status: SHIPPED | OUTPATIENT
Start: 2019-07-15 | End: 2020-04-17 | Stop reason: ALTCHOICE

## 2019-07-15 RX ORDER — METHOCARBAMOL 500 MG/1
500 TABLET, FILM COATED ORAL 2 TIMES DAILY PRN
Qty: 60 TABLET | Refills: 0 | Status: SHIPPED | OUTPATIENT
Start: 2019-07-15 | End: 2019-07-15

## 2019-07-15 RX ORDER — LANSOPRAZOLE 30 MG/1
30 CAPSULE, DELAYED RELEASE ORAL
COMMUNITY
End: 2021-07-01

## 2019-07-17 PROCEDURE — 86803 HEPATITIS C AB TEST: CPT | Performed by: OBSTETRICS & GYNECOLOGY

## 2019-07-17 PROCEDURE — 88175 CYTOPATH C/V AUTO FLUID REDO: CPT | Performed by: OBSTETRICS & GYNECOLOGY

## 2019-07-17 PROCEDURE — 87389 HIV-1 AG W/HIV-1&-2 AB AG IA: CPT | Performed by: OBSTETRICS & GYNECOLOGY

## 2019-07-17 PROCEDURE — 87624 HPV HI-RISK TYP POOLED RSLT: CPT | Performed by: OBSTETRICS & GYNECOLOGY

## 2019-08-05 DIAGNOSIS — G44.209 MIXED MIGRAINE AND MUSCLE CONTRACTION HEADACHE: ICD-10-CM

## 2019-08-05 DIAGNOSIS — G43.909 MIXED MIGRAINE AND MUSCLE CONTRACTION HEADACHE: ICD-10-CM

## 2019-08-07 RX ORDER — TOPIRAMATE 100 MG/1
TABLET, FILM COATED ORAL
Qty: 75 TABLET | Refills: 2 | Status: SHIPPED | OUTPATIENT
Start: 2019-08-07 | End: 2019-11-06

## 2019-08-26 DIAGNOSIS — G43.709 CHRONIC MIGRAINE WITHOUT AURA WITHOUT STATUS MIGRAINOSUS, NOT INTRACTABLE: ICD-10-CM

## 2019-08-27 ENCOUNTER — TELEPHONE (OUTPATIENT)
Dept: NEUROLOGY | Facility: CLINIC | Age: 34
End: 2019-08-27

## 2019-08-27 DIAGNOSIS — G43.709 CHRONIC MIGRAINE WITHOUT AURA WITHOUT STATUS MIGRAINOSUS, NOT INTRACTABLE: ICD-10-CM

## 2019-08-27 RX ORDER — ELETRIPTAN HYDROBROMIDE 40 MG/1
TABLET, FILM COATED ORAL
Qty: 12 TABLET | Refills: 5 | Status: SHIPPED | OUTPATIENT
Start: 2019-08-27 | End: 2020-03-09

## 2019-08-27 NOTE — TELEPHONE ENCOUNTER
Anika Amin at [facility] notified that Dr Gavino Proctor states patient has been on Eletriptan for a long tome and has not had any issues and she is ok with the combination as patient only takes Eletriptan as needed.

## 2019-08-27 NOTE — TELEPHONE ENCOUNTER
Patient requesting refill on Relpax. Pharmacist states there is a drug interaction problem because patient is also on [place], and Pristiq. Relpax was not mentioned in LOV notes per Epic review.  Will check with provider to see if patient is to continue Relp

## 2019-09-12 ENCOUNTER — APPOINTMENT (OUTPATIENT)
Dept: ULTRASOUND IMAGING | Facility: HOSPITAL | Age: 34
End: 2019-09-12
Attending: FAMILY MEDICINE
Payer: COMMERCIAL

## 2019-09-12 ENCOUNTER — HOSPITAL ENCOUNTER (OUTPATIENT)
Age: 34
Discharge: HOME | End: 2019-09-12
Attending: FAMILY MEDICINE
Payer: COMMERCIAL

## 2019-09-12 VITALS
HEART RATE: 73 BPM | BODY MASS INDEX: 33.34 KG/M2 | TEMPERATURE: 98 F | RESPIRATION RATE: 18 BRPM | DIASTOLIC BLOOD PRESSURE: 83 MMHG | WEIGHT: 220 LBS | SYSTOLIC BLOOD PRESSURE: 125 MMHG | HEIGHT: 68 IN | OXYGEN SATURATION: 100 %
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
## 2019-09-12 DIAGNOSIS — S70.12XA CONTUSION OF LEFT THIGH, INITIAL ENCOUNTER: Primary | ICD-10-CM

## 2019-09-12 PROCEDURE — 99214 OFFICE O/P EST MOD 30 MIN: CPT

## 2019-09-12 PROCEDURE — 99213 OFFICE O/P EST LOW 20 MIN: CPT

## 2019-09-12 RX ORDER — NAPROXEN 500 MG/1
500 TABLET ORAL 2 TIMES DAILY PRN
Qty: 20 TABLET | Refills: 0 | Status: SHIPPED | OUTPATIENT
Start: 2019-09-12 | End: 2019-09-19

## 2019-09-12 NOTE — ED INITIAL ASSESSMENT (HPI)
Today, pt tripped on the sidewalk and c/o left outer thigh pain/burning. Took IBU. Denies numbness/tingling.

## 2019-09-12 NOTE — ED PROVIDER NOTES
Patient Seen in: [facility]    History   Patient presents with:  Fall (musculoskeletal, neurologic)    Stated Complaint: post fall, left thigh pain    HPI    61-year-old female with previous history of protein C, protein S CHOLECYSTECTOMY N/A 5/22/2017    Performed by Fernando Mendoza MD at [facility] IUD, 5 YEAR  10-4-14    AND REMOVAL   • MYRINGOTOMY, LASER-ASSISTED     • OTHER SURGICAL HISTORY Right 1998    KNEE ARTHROSCOPY   • OTHER SURGICAL HISTORY      left extremity there is localized area of bruising no ecchymosis measuring3 x 3 cm with localized tenderness to the left anterior thigh, lateral thigh    ED Course   Labs Reviewed - No data to display         MDM     Patient with previous history of protein S,

## 2019-11-06 DIAGNOSIS — G44.209 MIXED MIGRAINE AND MUSCLE CONTRACTION HEADACHE: ICD-10-CM

## 2019-11-06 DIAGNOSIS — G43.909 MIXED MIGRAINE AND MUSCLE CONTRACTION HEADACHE: ICD-10-CM

## 2019-11-07 RX ORDER — TOPIRAMATE 100 MG/1
TABLET, FILM COATED ORAL
Qty: 75 TABLET | Refills: 0 | Status: SHIPPED | OUTPATIENT
Start: 2019-11-07 | End: 2019-12-07

## 2019-12-07 DIAGNOSIS — G44.209 MIXED MIGRAINE AND MUSCLE CONTRACTION HEADACHE: ICD-10-CM

## 2019-12-07 DIAGNOSIS — G43.909 MIXED MIGRAINE AND MUSCLE CONTRACTION HEADACHE: ICD-10-CM

## 2019-12-09 RX ORDER — TOPIRAMATE 100 MG/1
TABLET, FILM COATED ORAL
Qty: 75 TABLET | Refills: 0 | Status: SHIPPED | OUTPATIENT
Start: 2019-12-09 | End: 2020-01-27

## 2020-01-25 ENCOUNTER — TELEPHONE (OUTPATIENT)
Dept: NEUROLOGY | Facility: CLINIC | Age: 35
End: 2020-01-25

## 2020-01-25 DIAGNOSIS — G44.209 MIXED MIGRAINE AND MUSCLE CONTRACTION HEADACHE: ICD-10-CM

## 2020-01-25 DIAGNOSIS — G43.909 MIXED MIGRAINE AND MUSCLE CONTRACTION HEADACHE: ICD-10-CM

## 2020-01-27 RX ORDER — TOPIRAMATE 100 MG/1
TABLET, FILM COATED ORAL
Qty: 75 TABLET | Refills: 0 | Status: SHIPPED | OUTPATIENT
Start: 2020-01-27 | End: 2020-02-17

## 2020-02-17 ENCOUNTER — OFFICE VISIT (OUTPATIENT)
Dept: NEUROLOGY | Facility: CLINIC | Age: 35
End: 2020-02-17
Payer: COMMERCIAL

## 2020-02-17 VITALS
WEIGHT: 210 LBS | RESPIRATION RATE: 16 BRPM | DIASTOLIC BLOOD PRESSURE: 74 MMHG | SYSTOLIC BLOOD PRESSURE: 120 MMHG | BODY MASS INDEX: 32 KG/M2 | HEART RATE: 76 BPM

## 2020-02-17 DIAGNOSIS — G44.209 MIXED MIGRAINE AND MUSCLE CONTRACTION HEADACHE: Primary | ICD-10-CM

## 2020-02-17 DIAGNOSIS — G43.909 MIXED MIGRAINE AND MUSCLE CONTRACTION HEADACHE: Primary | ICD-10-CM

## 2020-02-17 PROCEDURE — 99213 OFFICE O/P EST LOW 20 MIN: CPT | Performed by: OTHER

## 2020-02-17 RX ORDER — ACETAMINOPHEN AND CODEINE PHOSPHATE 120; 12 MG/5ML; MG/5ML
SOLUTION ORAL DAILY
COMMUNITY
Start: 2020-01-16 | End: 2020-07-13

## 2020-02-17 RX ORDER — TOPIRAMATE 100 MG/1
TABLET, FILM COATED ORAL
Qty: 75 TABLET | Refills: 5 | Status: SHIPPED | OUTPATIENT
Start: 2020-02-17 | End: 2020-09-16

## 2020-02-17 RX ORDER — BREXPIPRAZOLE 3 MG/1
3 TABLET ORAL DAILY
COMMUNITY
Start: 2020-01-25

## 2020-02-17 NOTE — PROGRESS NOTES
HPI:    Patient ID: Walter Almaguer is a 36year old female. Migraine         Patient is here for follow up for headaches. Since last office visit headaches have improved.  She states the addition of Robaxin helped the neck tension and prevents having a m Laterality Date   • ADENOIDECTOMY     • CHOLECYSTECTOMY  5/22/17   • COLONOSCOPY  05/06/2011    wnl per pt   • COLONOSCOPY     • COLONOSCOPY  05/24/2018    Christopher/TIFFANIE   • COLPOSCOPY,BX CERVIX/ENDOCERV CURR  2009, 2006    Has had 2-moderate dysplasia, Cardiovascular: Negative. Musculoskeletal: Negative for neck stiffness. Neurological: Negative for headaches. All other systems reviewed and are negative.            Current Outpatient Medications   Medication Sig Dispense Refill   • REXULTI 2 MG O rate 16, weight 210 lb (95.3 kg), not currently breastfeeding. General: A 36year old female in no apparent distress  HEENT Normocephalic and atraumatic. Cardiovascular: Normal rate, regular rhythm and normal heart sounds.     Pulmonary/Chest: Effort nor

## 2020-03-08 ENCOUNTER — TELEPHONE (OUTPATIENT)
Dept: NEUROLOGY | Facility: CLINIC | Age: 35
End: 2020-03-08

## 2020-03-08 DIAGNOSIS — G43.709 CHRONIC MIGRAINE W/O AURA, NOT INTRACTABLE, W/O STAT MIGR: Primary | ICD-10-CM

## 2020-03-08 RX ORDER — METHYLPREDNISOLONE 4 MG/1
TABLET ORAL
Qty: 1 PACKAGE | Refills: 0 | Status: SHIPPED | OUTPATIENT
Start: 2020-03-08 | End: 2020-03-21 | Stop reason: ALTCHOICE

## 2020-03-08 NOTE — TELEPHONE ENCOUNTER
Neurology on-call cross cover note:     Received call regarding migraines - usual type with light / sound / movement sensitivity - did not respond to Relpax x2 yesterday and thus far this AM    Discussed options and agrees to medrol dose pack; advised vipin

## 2020-03-09 ENCOUNTER — TELEPHONE (OUTPATIENT)
Dept: NEUROLOGY | Facility: CLINIC | Age: 35
End: 2020-03-09

## 2020-03-09 DIAGNOSIS — G43.909 MIXED MIGRAINE AND MUSCLE CONTRACTION HEADACHE: ICD-10-CM

## 2020-03-09 DIAGNOSIS — G43.709 CHRONIC MIGRAINE WITHOUT AURA WITHOUT STATUS MIGRAINOSUS, NOT INTRACTABLE: ICD-10-CM

## 2020-03-09 DIAGNOSIS — R51.9 MIXED HEADACHE: ICD-10-CM

## 2020-03-09 DIAGNOSIS — G43.709 CHRONIC MIGRAINE W/O AURA, NOT INTRACTABLE, W/O STAT MIGR: Primary | ICD-10-CM

## 2020-03-09 DIAGNOSIS — G44.209 MIXED MIGRAINE AND MUSCLE CONTRACTION HEADACHE: ICD-10-CM

## 2020-03-09 RX ORDER — SUMATRIPTAN 6 MG/.5ML
6 INJECTION, SOLUTION SUBCUTANEOUS ONCE
Qty: 0.5 ML | Refills: 3 | Status: SHIPPED | OUTPATIENT
Start: 2020-03-09 | End: 2020-03-09

## 2020-03-09 NOTE — TELEPHONE ENCOUNTER
Patient notified to continue Medrol Dose Rhett and patient states she is willing to try the Sumatriptan SC injectable instead of the oral Sumatriptan. Rx Sumatriptan 6mg SC Auto-injector pended for MD review and signature.

## 2020-03-09 NOTE — TELEPHONE ENCOUNTER
Continue Medrol dose pack it will help break the headache cycle. Is the patient ok taking Sumatriptan injection SC- works faster than oral meds.

## 2020-03-09 NOTE — TELEPHONE ENCOUNTER
Pt spoke with on call provider on 03/08/2020 d/t to migraine, pt was prescribed MDP and advised by on-call provider to try OTC abortive medication. Called pt to follow up on migraine. She states that migraine pain has continually decreased throughout the day and she is currently at 0/10 pain. Pt states that she can feel tension in occipital area and states this happens prior to her migraines, she is concerned she may experience another migraine d/t these symptoms. Advised pt to continue to take MDP and to call office if needed. Pt states she tried Eletriptan at onset of migraine which did not help. She states in the past brand Relpax did relieve migraine. Pt states Maxalt helped in the past but lost it's efficacy, she would like to know if she can try a different abortive medication.

## 2020-03-21 ENCOUNTER — HOSPITAL ENCOUNTER (OUTPATIENT)
Age: 35
Discharge: HOME | End: 2020-03-21
Attending: FAMILY MEDICINE
Payer: COMMERCIAL

## 2020-03-21 VITALS
DIASTOLIC BLOOD PRESSURE: 71 MMHG | TEMPERATURE: 98 F | HEART RATE: 71 BPM | RESPIRATION RATE: 18 BRPM | SYSTOLIC BLOOD PRESSURE: 106 MMHG | OXYGEN SATURATION: 100 %

## 2020-03-21 DIAGNOSIS — K11.20 PAROTIDITIS: ICD-10-CM

## 2020-03-21 DIAGNOSIS — R22.1 LOCALIZED SWELLING, MASS AND LUMP, NECK: Primary | ICD-10-CM

## 2020-03-21 PROCEDURE — 99214 OFFICE O/P EST MOD 30 MIN: CPT

## 2020-03-21 PROCEDURE — 99213 OFFICE O/P EST LOW 20 MIN: CPT

## 2020-03-21 RX ORDER — CLINDAMYCIN HYDROCHLORIDE 300 MG/1
300 CAPSULE ORAL 3 TIMES DAILY
Qty: 30 CAPSULE | Refills: 0 | Status: SHIPPED | OUTPATIENT
Start: 2020-03-21 | End: 2020-03-31

## 2020-03-21 RX ORDER — SUMATRIPTAN 6 MG/.5ML
6 INJECTION, SOLUTION SUBCUTANEOUS ONCE
COMMUNITY
End: 2021-03-03

## 2020-03-21 NOTE — ED PROVIDER NOTES
Patient Seen in: [facility]      History   Patient presents with:  Neck Pain    Stated Complaint: SWOLLEN GLAND LEFT SIDE     HPI  Sudden onset left sided swelling and pain of submandibular region.   Got worse as she was eating l Sleep apnea    • Sleep disturbance    • Stool incontinence    • Stress    • Uncomfortable fullness after meals    • Urticaria    • Vitamin D deficiency    • Vomiting    • Walking pneumonia 2015   • Wears glasses    • Weight gain    • Weight loss    • [facility] voice change. Eyes: Negative. Respiratory: Negative for cough and shortness of breath. Cardiovascular: Negative for chest pain. Gastrointestinal: Negative. Musculoskeletal: Positive for neck pain. Negative for neck stiffness.    Skin: Negative Swelling and tenderness of left submandubular gland. Airway patent. Tonsils absent. No petechiae. No trismus. Cardiovascular:      Rate and Rhythm: Normal rate and regular rhythm. Pulses: Normal pulses. Heart sounds: Normal heart sounds.    P

## 2020-03-21 NOTE — ED INITIAL ASSESSMENT (HPI)
Woke up this morning with neck pain. As the day went, had difficulty eating. Noted left side neck to be swollen.

## 2020-03-23 NOTE — ED AVS SNAPSHOT
Angierob Walter   MRN: [id number]    Department:  [facility] Emergency Department   Date of Visit:  7/10/2018           Disclosure     Insurance plans vary and the physician(s) referred by the ER may not be covered by your plan.  Please contact your See TN 3/16/20.     Doing fine on the Sertraline 25mg daily. She has existing refills and will plan to come back into the office when covid quells. She will call if any problems in the meantime. She does have existing refills.    tell this physician (or your personal doctor if your instructions are to return to your personal doctor) about any new or lasting problems. The primary care or specialist physician will see patients referred from the [facility] Emergency Department.  Pastor Triplett

## 2020-05-19 ENCOUNTER — HOSPITAL ENCOUNTER (OUTPATIENT)
Dept: ULTRASOUND IMAGING | Facility: HOSPITAL | Age: 35
Discharge: HOME | End: 2020-05-19
Attending: OTOLARYNGOLOGY
Payer: COMMERCIAL

## 2020-05-19 DIAGNOSIS — E04.1 THYROID NODULE: ICD-10-CM

## 2020-05-19 PROCEDURE — 10005 FNA BX W/US GDN 1ST LES: CPT | Performed by: OTOLARYNGOLOGY

## 2020-05-19 PROCEDURE — 88173 CYTOPATH EVAL FNA REPORT: CPT | Performed by: OTOLARYNGOLOGY

## 2020-07-16 ENCOUNTER — HOSPITAL ENCOUNTER (OUTPATIENT)
Age: 35
Discharge: HOME | End: 2020-07-16
Payer: COMMERCIAL

## 2020-07-16 ENCOUNTER — HOSPITAL ENCOUNTER (OUTPATIENT)
Age: 35
Discharge: STILL ADMITTED OR ADMINISTRATIVE | End: 2020-07-16
Payer: COMMERCIAL

## 2020-07-16 VITALS
DIASTOLIC BLOOD PRESSURE: 79 MMHG | TEMPERATURE: 98 F | HEART RATE: 65 BPM | SYSTOLIC BLOOD PRESSURE: 116 MMHG | OXYGEN SATURATION: 99 % | RESPIRATION RATE: 16 BRPM

## 2020-07-16 DIAGNOSIS — H00.014 HORDEOLUM EXTERNUM OF LEFT UPPER EYELID: Primary | ICD-10-CM

## 2020-07-16 PROCEDURE — 99202 OFFICE O/P NEW SF 15 MIN: CPT | Performed by: NURSE PRACTITIONER

## 2020-07-16 RX ORDER — ERYTHROMYCIN 5 MG/G
1 OINTMENT OPHTHALMIC EVERY 6 HOURS
Qty: 1 G | Refills: 0 | Status: SHIPPED | OUTPATIENT
Start: 2020-07-16 | End: 2020-07-23
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
## 2020-07-17 NOTE — ED INITIAL ASSESSMENT (HPI)
x2 weeks ago Pt c/o stye that \"popped ovenight one night and felt better. \"    7/15 Pt states she has another stye on her left upper eye.   Denies vision issues

## 2020-07-20 PROCEDURE — 99213 OFFICE O/P EST LOW 20 MIN: CPT | Performed by: NURSE PRACTITIONER

## 2020-08-06 ENCOUNTER — APPOINTMENT (OUTPATIENT)
Dept: ULTRASOUND IMAGING | Age: 35
End: 2020-08-06
Attending: EMERGENCY MEDICINE
Payer: COMMERCIAL

## 2020-08-06 ENCOUNTER — HOSPITAL ENCOUNTER (EMERGENCY)
Age: 35
Discharge: HOME | End: 2020-08-06
Attending: EMERGENCY MEDICINE
Payer: COMMERCIAL

## 2020-08-06 VITALS
TEMPERATURE: 99 F | DIASTOLIC BLOOD PRESSURE: 55 MMHG | HEART RATE: 73 BPM | RESPIRATION RATE: 16 BRPM | WEIGHT: 220 LBS | SYSTOLIC BLOOD PRESSURE: 115 MMHG | BODY MASS INDEX: 33.34 KG/M2 | HEIGHT: 68 IN | OXYGEN SATURATION: 99 %

## 2020-08-06 DIAGNOSIS — T79.2XXD TRAUMATIC SEROMA OF LEFT THIGH, SUBSEQUENT ENCOUNTER: Primary | ICD-10-CM

## 2020-08-06 PROCEDURE — 93971 EXTREMITY STUDY: CPT | Performed by: EMERGENCY MEDICINE

## 2020-08-06 PROCEDURE — 99284 EMERGENCY DEPT VISIT MOD MDM: CPT

## 2020-08-07 NOTE — ED INITIAL ASSESSMENT (HPI)
Pt had a bad injury in sept, states she was seen [facility]  2 mths ago had neg dvt us states  \" I had lymphatic injury\". Pt c/o lle pain 4 days ago. Pt denies cp or sob.

## 2020-08-07 NOTE — ED PROVIDER NOTES
Patient Seen in: [facility] In [place]      History   Patient presents with:  Deep Vein Thrombosis    Stated Complaint: injured left leg last sept , has appt for us next week but pain is worsening    HPI    This is a 81-year-old femal Loss of appetite    • Migraines    • Moderate dysplasia of cervix 2006    COLPO   • MTHFR (methylene THF reductase) deficiency and homocystinuria (HCC)    • Nausea    • Obesity, unspecified    • Pain in joints    • Pain with bowel movements    • Personal h next week but pain is worsening  Other systems are as noted in HPI. Constitutional and vital signs reviewed. All other systems reviewed and negative except as noted above.     Physical Exam     ED Triage Vitals [08/06/20 2129]   /78   Pulse 74 upper left lateral thigh for the past four days. She also has a history of a blood clotting disorder. FINDINGS:  EXTREMITY EXAMINED:  Left SAPHENOFEMORAL JUNCTION:  No reflux. THROMBI:  None visible.  COMPRESSION:  Normal compressibility, phasicity, and DISPLAY PLAN FREE TEXT

## 2020-08-22 ENCOUNTER — APPOINTMENT (OUTPATIENT)
Dept: GENERAL RADIOLOGY | Age: 35
End: 2020-08-22
Attending: PHYSICIAN ASSISTANT
Payer: COMMERCIAL

## 2020-08-22 ENCOUNTER — HOSPITAL ENCOUNTER (OUTPATIENT)
Age: 35
Discharge: HOME | End: 2020-08-22
Payer: COMMERCIAL

## 2020-08-22 VITALS
SYSTOLIC BLOOD PRESSURE: 121 MMHG | RESPIRATION RATE: 18 BRPM | DIASTOLIC BLOOD PRESSURE: 87 MMHG | HEART RATE: 75 BPM | TEMPERATURE: 99 F | OXYGEN SATURATION: 100 %

## 2020-08-22 DIAGNOSIS — M25.551 RIGHT HIP PAIN: Primary | ICD-10-CM

## 2020-08-22 DIAGNOSIS — M76.891 HIP FLEXOR TENDINITIS, RIGHT: ICD-10-CM

## 2020-08-22 PROCEDURE — 99203 OFFICE O/P NEW LOW 30 MIN: CPT | Performed by: PHYSICIAN ASSISTANT

## 2020-08-22 PROCEDURE — 73502 X-RAY EXAM HIP UNI 2-3 VIEWS: CPT | Performed by: PHYSICIAN ASSISTANT

## 2020-08-22 RX ORDER — PREDNISONE 20 MG/1
40 TABLET ORAL DAILY
Qty: 10 TABLET | Refills: 0 | Status: SHIPPED | OUTPATIENT
Start: 2020-08-22 | End: 2020-08-27

## 2020-08-22 NOTE — ED INITIAL ASSESSMENT (HPI)
Patient states she fell while chasing after her puppy 5-7 days ago. States \"I fell weird and twisted my right leg\". Developed pain in her right groin the following day. Pain was improving until today and pain got worse.

## 2020-08-22 NOTE — ED PROVIDER NOTES
Patient Seen in: [address] [facility]      History   Patient presents with:  Groin Pain    Stated Complaint: hip injury x 1 week ago    HPI    Pleasant 67-year-old female.   6 days prior to arrival, patient was chasing her puppy when she awkwar of motion  Skin: No sign of trauma, Skin warm and dry, no induration or sign of infection.    Neuro: Cranial nerves intact    ED Course   Labs Reviewed - No data to display            MDM         Plain films of the right hip and pelvis are benign for acute

## 2020-09-09 ENCOUNTER — HOSPITAL ENCOUNTER (OUTPATIENT)
Age: 35
Discharge: HOME | End: 2020-09-09
Payer: COMMERCIAL

## 2020-09-09 ENCOUNTER — HOSPITAL ENCOUNTER (EMERGENCY)
Age: 35
Discharge: HOME | End: 2020-09-09
Attending: EMERGENCY MEDICINE
Payer: COMMERCIAL

## 2020-09-09 VITALS
SYSTOLIC BLOOD PRESSURE: 122 MMHG | DIASTOLIC BLOOD PRESSURE: 76 MMHG | OXYGEN SATURATION: 100 % | RESPIRATION RATE: 16 BRPM | HEART RATE: 93 BPM | TEMPERATURE: 98 F

## 2020-09-09 VITALS
RESPIRATION RATE: 18 BRPM | TEMPERATURE: 98 F | BODY MASS INDEX: 33 KG/M2 | SYSTOLIC BLOOD PRESSURE: 147 MMHG | HEART RATE: 69 BPM | WEIGHT: 220 LBS | OXYGEN SATURATION: 99 % | DIASTOLIC BLOOD PRESSURE: 85 MMHG

## 2020-09-09 DIAGNOSIS — K11.20 SIALADENITIS: Primary | ICD-10-CM

## 2020-09-09 DIAGNOSIS — J02.9 VIRAL PHARYNGITIS: ICD-10-CM

## 2020-09-09 DIAGNOSIS — Z20.822 SUSPECTED COVID-19 VIRUS INFECTION: Primary | ICD-10-CM

## 2020-09-09 LAB — POCT RAPID STREP: NEGATIVE

## 2020-09-09 PROCEDURE — 99283 EMERGENCY DEPT VISIT LOW MDM: CPT

## 2020-09-09 PROCEDURE — 87430 STREP A AG IA: CPT | Performed by: NURSE PRACTITIONER

## 2020-09-09 PROCEDURE — 87081 CULTURE SCREEN ONLY: CPT | Performed by: NURSE PRACTITIONER

## 2020-09-09 PROCEDURE — U0003 INFECTIOUS AGENT DETECTION BY NUCLEIC ACID (DNA OR RNA); SEVERE ACUTE RESPIRATORY SYNDROME CORONAVIRUS 2 (SARS-COV-2) (CORONAVIRUS DISEASE [COVID-19]), AMPLIFIED PROBE TECHNIQUE, MAKING USE OF HIGH THROUGHPUT TECHNOLOGIES AS DESCRIBED BY CMS-2020-01-R: HCPCS | Performed by: NURSE PRACTITIONER

## 2020-09-09 PROCEDURE — 99213 OFFICE O/P EST LOW 20 MIN: CPT | Performed by: NURSE PRACTITIONER

## 2020-09-09 RX ORDER — DEXAMETHASONE SODIUM PHOSPHATE 4 MG/ML
10 INJECTION, SOLUTION INTRA-ARTICULAR; INTRALESIONAL; INTRAMUSCULAR; INTRAVENOUS; SOFT TISSUE ONCE
Status: COMPLETED | OUTPATIENT
Start: 2020-09-09 | End: 2020-09-09

## 2020-09-09 RX ORDER — AMOXICILLIN 875 MG/1
875 TABLET, COATED ORAL 2 TIMES DAILY
Qty: 20 TABLET | Refills: 0 | Status: SHIPPED | OUTPATIENT
Start: 2020-09-09 | End: 2020-09-19
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
## 2020-09-09 NOTE — ED PROVIDER NOTES
Patient Seen in: THE [facility] Emergency Department In [place]      History   Patient presents with:   Other    Stated Complaint: left side of neck swelling, salivary gland problem    HPI    36-year-old female presents to the ED for evaluation of swelling to • Stool incontinence    • Stress    • Uncomfortable fullness after meals    • Urticaria    • Vitamin D deficiency    • Vomiting    • Walking pneumonia 2015   • Wears glasses    • Weight gain    • Weight loss    • Wheezing               Past Surgical Hist Resp 18   Wt 99.8 kg   LMP 08/24/2020 (Exact Date)   SpO2 99%   BMI 33.45 kg/m²         Physical Exam  Vitals signs and nursing note reviewed. Constitutional:       Appearance: She is well-developed. HENT:      Head: Atraumatic.       Right Ear: Externa questions were answered. Strict return precautions given for any difficulty breathing or swallowing. This case was discussed with my supervising physician, Dr. Svitlana Blackwell.   We discussed the clinical presentation, my exam, testing methodology and agreed on

## 2020-09-09 NOTE — ED INITIAL ASSESSMENT (HPI)
Patient noticed a swollen gland on the left front side of her neck. Her throat has had a full feeling for about 2 weeks without pain. She did recently have a crown put on a tooth on the upper jaw on that left side as well.  She has been feeling really achy

## 2020-09-09 NOTE — ED INITIAL ASSESSMENT (HPI)
Seen at [facility] today for her swollen glands, pt was advised to come back to ED if worsening sx. Pt states throat feels swollen aashish. While eating. Pt was prescribed oral steroids.  Rapid and covid test done at [facility]

## 2020-09-11 LAB — SARS-COV-2 RNA RESP QL NAA+PROBE: NOT DETECTED

## 2020-09-15 DIAGNOSIS — G44.209 MIXED MIGRAINE AND MUSCLE CONTRACTION HEADACHE: ICD-10-CM

## 2020-09-15 DIAGNOSIS — G43.909 MIXED MIGRAINE AND MUSCLE CONTRACTION HEADACHE: ICD-10-CM

## 2020-09-16 RX ORDER — TOPIRAMATE 100 MG/1
TABLET, FILM COATED ORAL
Qty: 75 TABLET | Refills: 2 | Status: SHIPPED | OUTPATIENT
Start: 2020-09-16 | End: 2020-12-22

## 2020-09-16 NOTE — TELEPHONE ENCOUNTER
Sent the patient a [employer] message asking to make an appt for further medication refills.     Medication: TOPIRAMATE 100 MG Oral Tab    Date of last refill: 02/17/2020 (#75/5)  [place]    Last office visit: 02/17/2020

## 2020-10-21 ENCOUNTER — NURSE ONLY (OUTPATIENT)
Dept: NEUROLOGY | Facility: CLINIC | Age: 35
End: 2020-10-21
Payer: COMMERCIAL

## 2020-10-21 ENCOUNTER — TELEPHONE (OUTPATIENT)
Dept: NEUROLOGY | Facility: CLINIC | Age: 35
End: 2020-10-21

## 2020-10-21 DIAGNOSIS — G43.709 CHRONIC MIGRAINE W/O AURA, NOT INTRACTABLE, W/O STAT MIGR: ICD-10-CM

## 2020-10-21 DIAGNOSIS — G43.909 MIXED MIGRAINE AND MUSCLE CONTRACTION HEADACHE: Primary | ICD-10-CM

## 2020-10-21 DIAGNOSIS — G44.209 MIXED MIGRAINE AND MUSCLE CONTRACTION HEADACHE: Primary | ICD-10-CM

## 2020-10-21 DIAGNOSIS — G43.709 CHRONIC MIGRAINE W/O AURA, NOT INTRACTABLE, W/O STAT MIGR: Primary | ICD-10-CM

## 2020-10-21 PROCEDURE — 96372 THER/PROPH/DIAG INJ SC/IM: CPT | Performed by: OTHER

## 2020-10-21 RX ORDER — METHYLPREDNISOLONE 4 MG/1
TABLET ORAL
Qty: 1 PACKAGE | Refills: 0 | Status: SHIPPED | OUTPATIENT
Start: 2020-10-21 | End: 2020-12-29 | Stop reason: ALTCHOICE

## 2020-10-21 RX ORDER — KETOROLAC TROMETHAMINE 30 MG/ML
30 INJECTION, SOLUTION INTRAMUSCULAR; INTRAVENOUS ONCE
Status: COMPLETED | OUTPATIENT
Start: 2020-10-21 | End: 2020-10-21

## 2020-10-21 RX ADMIN — Medication 10 MG: at 15:19:00

## 2020-10-21 RX ADMIN — KETOROLAC TROMETHAMINE 30 MG: 30 INJECTION, SOLUTION INTRAMUSCULAR; INTRAVENOUS at 15:21:00

## 2020-10-21 NOTE — TELEPHONE ENCOUNTER
Patient can come in for Toradol Decadron injection today and start MDP tomorrow afternoon per Dr Mala Hawkins. Patient notified of above and states will come in at 2:30 today. Rx MDP sent to [facility] per verbal order.

## 2020-10-21 NOTE — TELEPHONE ENCOUNTER
Acute Migraine assessment:    Is this your typical migraine? Describe any change in character from past migraines.   Yes:     Location of Pain (select all that apply):  neck very tight and all over her head with pressure both temples  # Days pain has been

## 2020-11-15 ENCOUNTER — HOSPITAL ENCOUNTER (EMERGENCY)
Age: 35
Discharge: HOME | End: 2020-11-15
Attending: EMERGENCY MEDICINE
Payer: COMMERCIAL

## 2020-11-15 ENCOUNTER — APPOINTMENT (OUTPATIENT)
Dept: ULTRASOUND IMAGING | Age: 35
End: 2020-11-15
Attending: EMERGENCY MEDICINE
Payer: COMMERCIAL

## 2020-11-15 VITALS
SYSTOLIC BLOOD PRESSURE: 120 MMHG | DIASTOLIC BLOOD PRESSURE: 68 MMHG | HEART RATE: 74 BPM | RESPIRATION RATE: 16 BRPM | OXYGEN SATURATION: 100 % | HEIGHT: 68 IN | BODY MASS INDEX: 30.31 KG/M2 | WEIGHT: 200 LBS | TEMPERATURE: 98 F

## 2020-11-15 DIAGNOSIS — M79.89 LEG SWELLING: Primary | ICD-10-CM

## 2020-11-15 PROCEDURE — 99284 EMERGENCY DEPT VISIT MOD MDM: CPT

## 2020-11-15 PROCEDURE — 93970 EXTREMITY STUDY: CPT | Performed by: EMERGENCY MEDICINE

## 2020-11-15 NOTE — ED INITIAL ASSESSMENT (HPI)
Pain in right calf since yesterday, worse today, no swelling, left leg has a \"knot\" above knee, history of swelling lower leg, had a \"shearing injury\" to left leg, swelling to left leg for 3 days
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
## 2020-12-19 DIAGNOSIS — G43.909 MIXED MIGRAINE AND MUSCLE CONTRACTION HEADACHE: ICD-10-CM

## 2020-12-19 DIAGNOSIS — G44.209 MIXED MIGRAINE AND MUSCLE CONTRACTION HEADACHE: ICD-10-CM

## 2020-12-21 NOTE — TELEPHONE ENCOUNTER
Spoke with the patient to schedule the appt fot 01/25/2021.      Medication: TOPIRAMATE 100 MG Oral Tab    Date of last refill: 09/16/2020 (#75/2)  [place]    Last office visit: 02/17/2020  Due back to clinic per last

## 2020-12-22 DIAGNOSIS — G43.909 MIXED MIGRAINE AND MUSCLE CONTRACTION HEADACHE: ICD-10-CM

## 2020-12-22 DIAGNOSIS — G44.209 MIXED MIGRAINE AND MUSCLE CONTRACTION HEADACHE: ICD-10-CM

## 2020-12-22 RX ORDER — TOPIRAMATE 100 MG/1
TABLET, FILM COATED ORAL
Qty: 75 TABLET | Refills: 2 | Status: SHIPPED | OUTPATIENT
Start: 2020-12-22 | End: 2021-03-15

## 2020-12-22 RX ORDER — TOPIRAMATE 100 MG/1
TABLET, FILM COATED ORAL
Qty: 75 TABLET | Refills: 2 | Status: SHIPPED | OUTPATIENT
Start: 2020-12-22 | End: 2021-01-16

## 2020-12-22 NOTE — TELEPHONE ENCOUNTER
Medication: topiramate 100 MG Oral Tab    Date of last refill: 9/16/2020 (#75/2)  [place]    Last office visit: 2/17/2020  Due back to clinic per last office note:  6 months  Date next office visit scheduled:     Mary

## 2021-01-24 ENCOUNTER — APPOINTMENT (OUTPATIENT)
Dept: CT IMAGING | Age: 36
End: 2021-01-24
Attending: EMERGENCY MEDICINE
Payer: COMMERCIAL

## 2021-01-24 ENCOUNTER — HOSPITAL ENCOUNTER (OUTPATIENT)
Age: 36
Discharge: ANOTHER HOSPITAL | End: 2021-01-24
Payer: COMMERCIAL

## 2021-01-24 ENCOUNTER — HOSPITAL ENCOUNTER (EMERGENCY)
Age: 36
Discharge: HOME | End: 2021-01-24
Attending: EMERGENCY MEDICINE
Payer: COMMERCIAL

## 2021-01-24 VITALS
HEIGHT: 68 IN | TEMPERATURE: 98 F | HEART RATE: 84 BPM | WEIGHT: 240 LBS | BODY MASS INDEX: 36.37 KG/M2 | RESPIRATION RATE: 18 BRPM | OXYGEN SATURATION: 100 % | SYSTOLIC BLOOD PRESSURE: 133 MMHG | DIASTOLIC BLOOD PRESSURE: 80 MMHG

## 2021-01-24 VITALS
HEART RATE: 88 BPM | WEIGHT: 240 LBS | DIASTOLIC BLOOD PRESSURE: 84 MMHG | OXYGEN SATURATION: 100 % | SYSTOLIC BLOOD PRESSURE: 144 MMHG | RESPIRATION RATE: 16 BRPM | TEMPERATURE: 98 F | BODY MASS INDEX: 36 KG/M2

## 2021-01-24 DIAGNOSIS — R10.13 ABDOMINAL PAIN, EPIGASTRIC: Primary | ICD-10-CM

## 2021-01-24 DIAGNOSIS — R10.11 RUQ PAIN: Primary | ICD-10-CM

## 2021-01-24 LAB
ALBUMIN SERPL-MCNC: 3.4 G/DL (ref 3.4–5)
ALBUMIN/GLOB SERPL: 0.9 {RATIO} (ref 1–2)
ALP LIVER SERPL-CCNC: 81 U/L
ALT SERPL-CCNC: 21 U/L
ANION GAP SERPL CALC-SCNC: 6 MMOL/L (ref 0–18)
AST SERPL-CCNC: 11 U/L (ref 15–37)
ATRIAL RATE: 75 BPM
BASOPHILS # BLD AUTO: 0.06 X10(3) UL (ref 0–0.2)
BASOPHILS NFR BLD AUTO: 0.6 %
BILIRUB SERPL-MCNC: 0.3 MG/DL (ref 0.1–2)
BUN BLD-MCNC: 8 MG/DL (ref 7–18)
BUN/CREAT SERPL: 9.3 (ref 10–20)
CALCIUM BLD-MCNC: 8.6 MG/DL (ref 8.5–10.1)
CHLORIDE SERPL-SCNC: 109 MMOL/L (ref 98–112)
CO2 SERPL-SCNC: 24 MMOL/L (ref 21–32)
CREAT BLD-MCNC: 0.86 MG/DL
DEPRECATED RDW RBC AUTO: 44.6 FL (ref 35.1–46.3)
EOSINOPHIL # BLD AUTO: 0.28 X10(3) UL (ref 0–0.7)
EOSINOPHIL NFR BLD AUTO: 2.6 %
ERYTHROCYTE [DISTWIDTH] IN BLOOD BY AUTOMATED COUNT: 14 % (ref 11–15)
GLOBULIN PLAS-MCNC: 3.6 G/DL (ref 2.8–4.4)
GLUCOSE BLD-MCNC: 92 MG/DL (ref 70–99)
HCT VFR BLD AUTO: 34.9 %
HGB BLD-MCNC: 11.4 G/DL
IMM GRANULOCYTES # BLD AUTO: 0.02 X10(3) UL (ref 0–1)
IMM GRANULOCYTES NFR BLD: 0.2 %
LIPASE SERPL-CCNC: 153 U/L (ref 73–393)
LYMPHOCYTES # BLD AUTO: 3.49 X10(3) UL (ref 1–4)
LYMPHOCYTES NFR BLD AUTO: 33 %
M PROTEIN MFR SERPL ELPH: 7 G/DL (ref 6.4–8.2)
MCH RBC QN AUTO: 28.2 PG (ref 26–34)
MCHC RBC AUTO-ENTMCNC: 32.7 G/DL (ref 31–37)
MCV RBC AUTO: 86.4 FL
MONOCYTES # BLD AUTO: 0.61 X10(3) UL (ref 0.1–1)
MONOCYTES NFR BLD AUTO: 5.8 %
NEUTROPHILS # BLD AUTO: 6.11 X10 (3) UL (ref 1.5–7.7)
NEUTROPHILS # BLD AUTO: 6.11 X10(3) UL (ref 1.5–7.7)
NEUTROPHILS NFR BLD AUTO: 57.8 %
OSMOLALITY SERPL CALC.SUM OF ELEC: 286 MOSM/KG (ref 275–295)
P AXIS: 52 DEGREES
P-R INTERVAL: 140 MS
PLATELET # BLD AUTO: 286 10(3)UL (ref 150–450)
POCT BILIRUBIN URINE: NEGATIVE
POCT GLUCOSE URINE: NEGATIVE MG/DL
POCT KETONE URINE: NEGATIVE MG/DL
POCT LEUKOCYTE ESTERASE URINE: NEGATIVE
POCT NITRITE URINE: NEGATIVE
POCT PH URINE: 6.5 (ref 5–8)
POCT PROTEIN URINE: NEGATIVE MG/DL
POCT SPECIFIC GRAVITY URINE: 1.01
POCT URINE CLARITY: CLEAR
POCT URINE COLOR: YELLOW
POCT URINE PREGNANCY: NEGATIVE
POCT UROBILINOGEN URINE: 0.2 MG/DL
POTASSIUM SERPL-SCNC: 3.7 MMOL/L (ref 3.5–5.1)
Q-T INTERVAL: 378 MS
QRS DURATION: 82 MS
QTC CALCULATION (BEZET): 422 MS
R AXIS: 64 DEGREES
RBC # BLD AUTO: 4.04 X10(6)UL
SODIUM SERPL-SCNC: 139 MMOL/L (ref 136–145)
T AXIS: 36 DEGREES
TROPONIN I SERPL-MCNC: <0.045 NG/ML (ref ?–0.04)
VENTRICULAR RATE: 75 BPM
WBC # BLD AUTO: 10.6 X10(3) UL (ref 4–11)

## 2021-01-24 PROCEDURE — 96374 THER/PROPH/DIAG INJ IV PUSH: CPT

## 2021-01-24 PROCEDURE — 81002 URINALYSIS NONAUTO W/O SCOPE: CPT | Performed by: NURSE PRACTITIONER

## 2021-01-24 PROCEDURE — 80053 COMPREHEN METABOLIC PANEL: CPT | Performed by: EMERGENCY MEDICINE

## 2021-01-24 PROCEDURE — 99285 EMERGENCY DEPT VISIT HI MDM: CPT

## 2021-01-24 PROCEDURE — 93010 ELECTROCARDIOGRAM REPORT: CPT

## 2021-01-24 PROCEDURE — 74177 CT ABD & PELVIS W/CONTRAST: CPT | Performed by: EMERGENCY MEDICINE

## 2021-01-24 PROCEDURE — 71275 CT ANGIOGRAPHY CHEST: CPT | Performed by: EMERGENCY MEDICINE

## 2021-01-24 PROCEDURE — 83690 ASSAY OF LIPASE: CPT | Performed by: EMERGENCY MEDICINE

## 2021-01-24 PROCEDURE — 81025 URINE PREGNANCY TEST: CPT | Performed by: NURSE PRACTITIONER

## 2021-01-24 PROCEDURE — 96361 HYDRATE IV INFUSION ADD-ON: CPT

## 2021-01-24 PROCEDURE — 93005 ELECTROCARDIOGRAM TRACING: CPT

## 2021-01-24 PROCEDURE — 84484 ASSAY OF TROPONIN QUANT: CPT | Performed by: EMERGENCY MEDICINE

## 2021-01-24 PROCEDURE — 99205 OFFICE O/P NEW HI 60 MIN: CPT | Performed by: NURSE PRACTITIONER

## 2021-01-24 PROCEDURE — 85025 COMPLETE CBC W/AUTO DIFF WBC: CPT | Performed by: EMERGENCY MEDICINE

## 2021-01-24 RX ORDER — KETOROLAC TROMETHAMINE 30 MG/ML
30 INJECTION, SOLUTION INTRAMUSCULAR; INTRAVENOUS ONCE
Status: COMPLETED | OUTPATIENT
Start: 2021-01-24 | End: 2021-01-24

## 2021-01-24 RX ORDER — SODIUM CHLORIDE 9 MG/ML
INJECTION, SOLUTION INTRAVENOUS ONCE
Status: COMPLETED | OUTPATIENT
Start: 2021-01-24 | End: 2021-01-24

## 2021-01-24 RX ORDER — DICYCLOMINE HCL 20 MG
20 TABLET ORAL 4 TIMES DAILY PRN
Qty: 15 TABLET | Refills: 0 | Status: SHIPPED | OUTPATIENT
Start: 2021-01-24 | End: 2021-03-03

## 2021-01-24 NOTE — ED PROVIDER NOTES
Patient Seen in: Immediate [address] [place] [address]      History   Patient presents with:  Abdominal Pain    Stated Complaint: abd pain    HPI/Subjective:   HPI    81-year-old female presents for evaluation of epigastric pain that began around 12 PM today suddenly af skin    • Loss of appetite    • Migraines    • Moderate dysplasia of cervix 2006    COLPO   • MTHFR (methylene THF reductase) deficiency and homocystinuria (HCC)    • Nausea    • Obesity, unspecified    • Pain in joints    • Pain with bowel movements    • HPI.  Constitutional and vital signs reviewed. All other systems reviewed and negative except as noted above.     Physical Exam     ED Triage Vitals [01/24/21 1440]   /84   Pulse 88   Resp 16   Temp 97.7 °F (36.5 °C)   Temp src Temporal   SpO2 10 patient to [place] emergency department for further evaluation and treatment. I discussed this with the patient, she will drive herself and agrees with plan of care.                  Disposition and Plan     Clinical Impression:  Abdominal pain, epigast

## 2021-01-24 NOTE — ED INITIAL ASSESSMENT (HPI)
1330 Sudden upper abd stabbing/pain lasting a couple minutes, Pt c/o constant ache, +nausea  Denies: fevers, vomiting/diarrhea  Last BM 1/24 WNL per Pt.   Pt is on Amoxil for OM
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
## 2021-01-24 NOTE — ED NOTES
Unable to provide a urine sample at this time but urine at immediate care was negative pregnancy test

## 2021-01-27 PROBLEM — M23.91 INTERNAL DERANGEMENT OF KNEE, RIGHT: Status: ACTIVE | Noted: 2021-01-27

## 2021-01-27 PROBLEM — S80.01XA CONTUSION OF RIGHT KNEE, INITIAL ENCOUNTER: Status: ACTIVE | Noted: 2021-01-27

## 2021-03-03 ENCOUNTER — OFFICE VISIT (OUTPATIENT)
Dept: NEUROLOGY | Facility: CLINIC | Age: 36
End: 2021-03-03
Payer: COMMERCIAL

## 2021-03-03 VITALS
DIASTOLIC BLOOD PRESSURE: 70 MMHG | SYSTOLIC BLOOD PRESSURE: 124 MMHG | RESPIRATION RATE: 16 BRPM | BODY MASS INDEX: 38 KG/M2 | HEART RATE: 70 BPM | WEIGHT: 248 LBS

## 2021-03-03 DIAGNOSIS — G43.709 CHRONIC MIGRAINE W/O AURA, NOT INTRACTABLE, W/O STAT MIGR: Primary | ICD-10-CM

## 2021-03-03 PROCEDURE — 3074F SYST BP LT 130 MM HG: CPT | Performed by: OTHER

## 2021-03-03 PROCEDURE — 99213 OFFICE O/P EST LOW 20 MIN: CPT | Performed by: OTHER

## 2021-03-03 PROCEDURE — 3078F DIAST BP <80 MM HG: CPT | Performed by: OTHER

## 2021-03-03 RX ORDER — SUMATRIPTAN 6 MG/.5ML
6 INJECTION, SOLUTION SUBCUTANEOUS ONCE
Qty: 0.5 ML | Refills: 0 | Status: SHIPPED | OUTPATIENT
Start: 2021-03-03 | End: 2021-03-03

## 2021-03-03 NOTE — PROGRESS NOTES
Patient states increase in migraines. Patient states migraines are last 2-3 days. Patient no longer wants to take sumatriptan.

## 2021-03-04 NOTE — PROGRESS NOTES
HPI:    Patient ID: Danilo Hancock is a 30year old female. Migraine        Lynseymayelin Tiwari presents today for follow up for headaches.  She has some increase migraines and states she tried Sumatriptan injections but was not able to tolerate- had extreme nausea a Surgical History:   Procedure Laterality Date   • ADENOIDECTOMY     • CHOLECYSTECTOMY  5/22/17   • COLONOSCOPY  05/06/2011    wnl per pt   • COLONOSCOPY     • COLONOSCOPY  05/24/2018    Christopher/TIFFANIE   • COLPOSCOPY,BX CERVIX/ENDOCERV CURR  2009, 2006 Negative. Eyes: Negative. Respiratory: Negative. Cardiovascular: Negative. Musculoskeletal: Negative for neck stiffness. Neurological: Negative for headaches. All other systems reviewed and are negative.            Current Outpatient Medicat Bowel sounds are normal.   Skin: Skin is warm and dry. Psychiatric:normal mood and affect. NEUROLOGICAL: This patient is alert and orientated x 3. Speech is fluent with intact comprehension. Pupils equally round and reactive to light.  3+ brisk bilatera

## 2021-03-14 DIAGNOSIS — G44.209 MIXED MIGRAINE AND MUSCLE CONTRACTION HEADACHE: ICD-10-CM

## 2021-03-14 DIAGNOSIS — G43.909 MIXED MIGRAINE AND MUSCLE CONTRACTION HEADACHE: ICD-10-CM

## 2021-03-15 RX ORDER — TOPIRAMATE 100 MG/1
TABLET, FILM COATED ORAL
Qty: 75 TABLET | Refills: 2 | Status: SHIPPED | OUTPATIENT
Start: 2021-03-15 | End: 2021-06-14

## 2021-03-15 NOTE — TELEPHONE ENCOUNTER
Medication: TOPIRAMATE 100 MG Oral Tab    Date of last refill: 12/22/2020 (#75/2)  [place]    Last office visit: 03/03/2021  Due back to clinic per last office note:  Around 09/03/2021  Date next office visit schedul

## 2021-04-01 ENCOUNTER — IMMUNIZATION (OUTPATIENT)
Dept: LAB | Age: 36
End: 2021-04-01
Attending: HOSPITALIST
Payer: COMMERCIAL

## 2021-04-01 DIAGNOSIS — Z23 NEED FOR VACCINATION: Primary | ICD-10-CM

## 2021-04-01 PROCEDURE — 0001A SARSCOV2 VAC 30MCG/0.3ML IM: CPT

## 2021-04-24 ENCOUNTER — IMMUNIZATION (OUTPATIENT)
Dept: LAB | Age: 36
End: 2021-04-24
Attending: HOSPITALIST
Payer: COMMERCIAL

## 2021-04-24 DIAGNOSIS — Z23 NEED FOR VACCINATION: Primary | ICD-10-CM

## 2021-04-24 PROCEDURE — 0002A SARSCOV2 VAC 30MCG/0.3ML IM: CPT

## 2021-04-26 ENCOUNTER — HOSPITAL ENCOUNTER (EMERGENCY)
Age: 36
Discharge: HOME | End: 2021-04-26
Attending: EMERGENCY MEDICINE
Payer: COMMERCIAL

## 2021-04-26 VITALS
DIASTOLIC BLOOD PRESSURE: 61 MMHG | TEMPERATURE: 99 F | HEART RATE: 70 BPM | SYSTOLIC BLOOD PRESSURE: 106 MMHG | RESPIRATION RATE: 16 BRPM | BODY MASS INDEX: 38 KG/M2 | OXYGEN SATURATION: 98 % | WEIGHT: 249.13 LBS

## 2021-04-26 DIAGNOSIS — G43.901 MIGRAINE WITH STATUS MIGRAINOSUS, NOT INTRACTABLE, UNSPECIFIED MIGRAINE TYPE: Primary | ICD-10-CM

## 2021-04-26 PROCEDURE — 99284 EMERGENCY DEPT VISIT MOD MDM: CPT | Performed by: EMERGENCY MEDICINE

## 2021-04-26 PROCEDURE — 96361 HYDRATE IV INFUSION ADD-ON: CPT | Performed by: EMERGENCY MEDICINE

## 2021-04-26 PROCEDURE — 96375 TX/PRO/DX INJ NEW DRUG ADDON: CPT | Performed by: EMERGENCY MEDICINE

## 2021-04-26 PROCEDURE — 96372 THER/PROPH/DIAG INJ SC/IM: CPT | Performed by: EMERGENCY MEDICINE

## 2021-04-26 PROCEDURE — 96374 THER/PROPH/DIAG INJ IV PUSH: CPT | Performed by: EMERGENCY MEDICINE

## 2021-04-26 RX ORDER — KETOROLAC TROMETHAMINE 15 MG/ML
15 INJECTION, SOLUTION INTRAMUSCULAR; INTRAVENOUS ONCE
Status: COMPLETED | OUTPATIENT
Start: 2021-04-26 | End: 2021-04-26

## 2021-04-26 RX ORDER — DIPHENHYDRAMINE HYDROCHLORIDE 50 MG/ML
25 INJECTION INTRAMUSCULAR; INTRAVENOUS ONCE
Status: COMPLETED | OUTPATIENT
Start: 2021-04-26 | End: 2021-04-26

## 2021-04-26 RX ORDER — DEXAMETHASONE SODIUM PHOSPHATE 4 MG/ML
10 VIAL (ML) INJECTION ONCE
Status: COMPLETED | OUTPATIENT
Start: 2021-04-26 | End: 2021-04-26

## 2021-04-26 RX ORDER — LAMOTRIGINE 25 MG/1
25 TABLET ORAL DAILY
COMMUNITY
End: 2021-05-13 | Stop reason: DRUGHIGH

## 2021-04-26 RX ORDER — METOCLOPRAMIDE HYDROCHLORIDE 5 MG/ML
10 INJECTION INTRAMUSCULAR; INTRAVENOUS ONCE
Status: COMPLETED | OUTPATIENT
Start: 2021-04-26 | End: 2021-04-26

## 2021-04-26 RX ORDER — SUMATRIPTAN 6 MG/.5ML
6 INJECTION, SOLUTION SUBCUTANEOUS ONCE
Status: COMPLETED | OUTPATIENT
Start: 2021-04-26 | End: 2021-04-26

## 2021-04-27 ENCOUNTER — TELEPHONE (OUTPATIENT)
Dept: NEUROLOGY | Facility: CLINIC | Age: 36
End: 2021-04-27

## 2021-04-27 DIAGNOSIS — G44.209 MIXED MIGRAINE AND MUSCLE CONTRACTION HEADACHE: Primary | ICD-10-CM

## 2021-04-27 DIAGNOSIS — G43.909 MIXED MIGRAINE AND MUSCLE CONTRACTION HEADACHE: Primary | ICD-10-CM

## 2021-04-27 DIAGNOSIS — G43.709 CHRONIC MIGRAINE W/O AURA, NOT INTRACTABLE, W/O STAT MIGR: ICD-10-CM

## 2021-04-27 RX ORDER — UBROGEPANT 50 MG/1
TABLET ORAL
Qty: 10 TABLET | Refills: 0 | Status: SHIPPED | OUTPATIENT
Start: 2021-04-27 | End: 2021-05-27

## 2021-04-27 NOTE — TELEPHONE ENCOUNTER
PA for Ubrelvy submitted on CMM, Key: [id number]. Approved through 4/27/2022.   [place] notified of approval.

## 2021-04-27 NOTE — TELEPHONE ENCOUNTER
Pt received a copay card for ubStartupeando. When pt went to pharm was told a perscription was needed before filling- [facility] in [place] on [address].   Pt was in ER last nite with a migraine

## 2021-04-27 NOTE — ED PROVIDER NOTES
Patient Seen in: THE [facility] In [place]      History   Patient presents with:  Headache    Stated Complaint: headache began this am. hx of migraine and states it feels like one. +Photophob*    HPI/Subjective:   PORSHA Thompson is a 35-ye • Irregular bowel habits    • Itch of skin    • Loss of appetite    • Migraines    • Moderate dysplasia of cervix 2006    COLPO   • MTHFR (methylene THF reductase) deficiency and homocystinuria (HCC)    • Nausea    • Obesity, unspecified    • Pain in matheus feels like one. +Photophob*  Other systems are as noted in HPI. Constitutional and vital signs reviewed. All other systems reviewed and negative except as noted above.     Physical Exam     ED Triage Vitals [04/26/21 1857]   /82   Pulse 76   Res concerning symptoms arise. Additional verbal discharge instructions are given and discussed. Discharge medications are discussed. The patient is in good condition throughout the visit today and remains so upon discharge.   I discuss the plan of care with treva

## 2021-04-27 NOTE — TELEPHONE ENCOUNTER
Spoke with [facility] pharmacist who states they did not receive the [place] prescription. Verbal order given for Ubrelvy 50mg #10 with read back confirmation received.

## 2021-05-16 ENCOUNTER — HOSPITAL ENCOUNTER (OUTPATIENT)
Age: 36
Discharge: HOME | End: 2021-05-16
Payer: COMMERCIAL

## 2021-05-16 VITALS
WEIGHT: 220 LBS | HEART RATE: 89 BPM | SYSTOLIC BLOOD PRESSURE: 138 MMHG | BODY MASS INDEX: 33.34 KG/M2 | DIASTOLIC BLOOD PRESSURE: 83 MMHG | TEMPERATURE: 98 F | HEIGHT: 68 IN | OXYGEN SATURATION: 98 % | RESPIRATION RATE: 14 BRPM

## 2021-05-16 DIAGNOSIS — M54.42 ACUTE LEFT-SIDED LOW BACK PAIN WITH LEFT-SIDED SCIATICA: ICD-10-CM

## 2021-05-16 DIAGNOSIS — M62.838 SPASM OF MUSCLE: Primary | ICD-10-CM

## 2021-05-16 PROCEDURE — 81002 URINALYSIS NONAUTO W/O SCOPE: CPT | Performed by: PHYSICIAN ASSISTANT

## 2021-05-16 PROCEDURE — 99213 OFFICE O/P EST LOW 20 MIN: CPT | Performed by: PHYSICIAN ASSISTANT

## 2021-05-16 PROCEDURE — 81025 URINE PREGNANCY TEST: CPT | Performed by: PHYSICIAN ASSISTANT

## 2021-05-16 RX ORDER — TRAMADOL HYDROCHLORIDE 50 MG/1
TABLET ORAL EVERY 6 HOURS PRN
Qty: 20 TABLET | Refills: 0 | Status: SHIPPED | OUTPATIENT
Start: 2021-05-16 | End: 2021-05-18

## 2021-05-16 NOTE — ED INITIAL ASSESSMENT (HPI)
Pt with c/o back pain. Pt currently on muscle relaxer, valium and prednisone since Thursday for the back pain by PMD.  Pt states pain not improving.

## 2021-05-16 NOTE — ED PROVIDER NOTES
Patient Seen in: Immediate [address] [place] [address]      History   Patient presents with:  Back Pain    Stated Complaint: Back pain    HPI/Subjective:   HPI    67-year-old female. For the past 10 to 14 days, the patient has had spasmodic back pain.   More recently, t habits    • Itch of skin    • Loss of appetite    • Migraines    • Moderate dysplasia of cervix 2006    COLPO   • MTHFR (methylene THF reductase) deficiency and homocystinuria (HCC)    • Nausea    • Obesity, unspecified    • Pain in joints    • Pain with b reviewed. All other systems reviewed and negative except as noted above.     Physical Exam     ED Triage Vitals [05/16/21 1400]   /83   Pulse 89   Resp 14   Temp 98.3 °F (36.8 °C)   Temp src Temporal   SpO2 98 %   O2 Device None (Room air) plan               Disposition and Plan     Clinical Impression:  Spasm of muscle  (primary encounter diagnosis)  Acute left-sided low back pain with left-sided sciatica     Disposition:  Discharge  5/16/2021  2:40 pm    Follow-up:  THE [facility]  3

## 2021-05-20 ENCOUNTER — HOSPITAL ENCOUNTER (OUTPATIENT)
Dept: GENERAL RADIOLOGY | Age: 36
Discharge: HOME | End: 2021-05-20
Attending: FAMILY MEDICINE
Payer: COMMERCIAL

## 2021-05-20 DIAGNOSIS — Q76.49 SACRALIZATION OF LUMBAR VERTEBRA: ICD-10-CM

## 2021-05-20 PROCEDURE — 72110 X-RAY EXAM L-2 SPINE 4/>VWS: CPT | Performed by: FAMILY MEDICINE

## 2021-05-24 NOTE — PROGRESS NOTES
Will discuss at next appointment.     Future Appointments  5/25/2021  2:45 PM    Barbara Burrell DO       [address]

## 2021-05-27 ENCOUNTER — TELEPHONE (OUTPATIENT)
Dept: NEUROLOGY | Facility: CLINIC | Age: 36
End: 2021-05-27

## 2021-06-02 DIAGNOSIS — G43.709 CHRONIC MIGRAINE W/O AURA, NOT INTRACTABLE, W/O STAT MIGR: Primary | ICD-10-CM

## 2021-06-02 RX ORDER — UBROGEPANT 100 MG/1
TABLET ORAL
Qty: 10 TABLET | Refills: 0 | Status: SHIPPED | OUTPATIENT
Start: 2021-06-02 | End: 2021-07-02

## 2021-06-02 NOTE — TELEPHONE ENCOUNTER
The [facility] Champ was working to treat migraines, but she is getting them more frequently. So she is taking more Ubrevly and now she is out and there are no more refills. Please call the pharmacy or call pt to advise.  (send to [facility] [address] in [place]

## 2021-06-02 NOTE — TELEPHONE ENCOUNTER
RN called patient back to discuss migraine frequency. Patient states she has been using UBRELVY 3-4 times per week as she is getting migraines that last 4-5 days. RN discuss [address] and correct usage of UBRELVY. Last RX was 4/27/2021    LOV was 3/3/2021.

## 2021-06-12 DIAGNOSIS — G44.209 MIXED MIGRAINE AND MUSCLE CONTRACTION HEADACHE: ICD-10-CM

## 2021-06-12 DIAGNOSIS — G43.909 MIXED MIGRAINE AND MUSCLE CONTRACTION HEADACHE: ICD-10-CM

## 2021-06-14 ENCOUNTER — TELEPHONE (OUTPATIENT)
Dept: NEUROLOGY | Facility: CLINIC | Age: 36
End: 2021-06-14

## 2021-06-14 DIAGNOSIS — G44.209 MIXED MIGRAINE AND MUSCLE CONTRACTION HEADACHE: ICD-10-CM

## 2021-06-14 DIAGNOSIS — G43.709 CHRONIC MIGRAINE W/O AURA, NOT INTRACTABLE, W/O STAT MIGR: Primary | ICD-10-CM

## 2021-06-14 DIAGNOSIS — G43.909 MIXED MIGRAINE AND MUSCLE CONTRACTION HEADACHE: ICD-10-CM

## 2021-06-14 RX ORDER — TOPIRAMATE 100 MG/1
TABLET, FILM COATED ORAL
Qty: 225 TABLET | Refills: 0 | Status: SHIPPED | OUTPATIENT
Start: 2021-06-14 | End: 2021-08-09

## 2021-06-14 NOTE — TELEPHONE ENCOUNTER
Pharmacy calling, pt recently started on Lamictal by other provider, making provider aware since pt is taking Topamax. Will route to provider to advise.

## 2021-06-14 NOTE — TELEPHONE ENCOUNTER
Medication: TOPIRAMATE 100 MG    Date of last refill: 3/15/21 (#75/2)  [place]     Last office visit: 3/3/2021  Due back to clinic per last office note:  6 months  Date next office visit scheduled:    Future Appointments

## 2021-06-17 NOTE — TELEPHONE ENCOUNTER
Spoke with patient and relayed Dr Jeff Stapleton recommendations. Patient states she has been having trouble sleeping since being on Topamax and Lamictal but states she has had increased anxiety recently due to her Hx of PTSD and Depression.  Patient states the

## 2021-06-17 NOTE — TELEPHONE ENCOUNTER
There is some risk of interaction between Topamax and Lamictal but we need to weight benefits over risk- if you are tolerating both meds fine then no need to modify the treatment.      For migraine we can try other options like CRGP monthly injections and

## 2021-06-18 RX ORDER — ERENUMAB-AOOE 70 MG/ML
70 INJECTION SUBCUTANEOUS
Qty: 1 EACH | Refills: 2 | Status: SHIPPED | OUTPATIENT
Start: 2021-06-18 | End: 2021-11-11

## 2021-06-21 ENCOUNTER — TELEPHONE (OUTPATIENT)
Dept: NEUROLOGY | Facility: CLINIC | Age: 36
End: 2021-06-21

## 2021-06-21 DIAGNOSIS — G43.709 CHRONIC MIGRAINE W/O AURA, NOT INTRACTABLE, W/O STAT MIGR: Primary | ICD-10-CM

## 2021-06-29 ENCOUNTER — LAB ENCOUNTER (OUTPATIENT)
Dept: LAB | Age: 36
End: 2021-06-29
Attending: INTERNAL MEDICINE
Payer: COMMERCIAL

## 2021-06-29 DIAGNOSIS — R14.0 BLOATING: ICD-10-CM

## 2021-06-29 DIAGNOSIS — Z86.19 HISTORY OF CLOSTRIDIUM DIFFICILE INFECTION: ICD-10-CM

## 2021-06-29 DIAGNOSIS — R11.0 NAUSEA: ICD-10-CM

## 2021-06-29 DIAGNOSIS — R19.7 DIARRHEA, UNSPECIFIED TYPE: ICD-10-CM

## 2021-06-29 PROCEDURE — 87493 C DIFF AMPLIFIED PROBE: CPT

## 2021-06-30 LAB — C DIFF TOX B STL QL: NEGATIVE

## 2021-08-12 ENCOUNTER — APPOINTMENT (OUTPATIENT)
Dept: ULTRASOUND IMAGING | Age: 36
End: 2021-08-12
Attending: PHYSICIAN ASSISTANT
Payer: COMMERCIAL

## 2021-08-12 ENCOUNTER — APPOINTMENT (OUTPATIENT)
Dept: CT IMAGING | Age: 36
End: 2021-08-12
Attending: PHYSICIAN ASSISTANT
Payer: COMMERCIAL

## 2021-08-12 ENCOUNTER — HOSPITAL ENCOUNTER (OUTPATIENT)
Age: 36
Discharge: LEFT BEFORE DISCHARGE | End: 2021-08-12
Payer: COMMERCIAL

## 2021-08-12 VITALS
DIASTOLIC BLOOD PRESSURE: 72 MMHG | TEMPERATURE: 97 F | SYSTOLIC BLOOD PRESSURE: 125 MMHG | BODY MASS INDEX: 42.44 KG/M2 | RESPIRATION RATE: 16 BRPM | WEIGHT: 280 LBS | OXYGEN SATURATION: 96 % | HEART RATE: 58 BPM | HEIGHT: 68 IN

## 2021-08-12 DIAGNOSIS — Z53.29 LEFT AGAINST MEDICAL ADVICE: ICD-10-CM

## 2021-08-12 DIAGNOSIS — R10.12 LUQ PAIN: Primary | ICD-10-CM

## 2021-08-12 LAB
#MXD IC: 0.6 X10ˆ3/UL (ref 0.1–1)
B-HCG UR QL: NEGATIVE
BUN BLD-MCNC: <5 MG/DL (ref 7–18)
CHLORIDE BLD-SCNC: 104 MMOL/L (ref 98–112)
CO2 BLD-SCNC: 23 MMOL/L (ref 21–32)
CREAT BLD-MCNC: 0.7 MG/DL
GLUCOSE BLD-MCNC: 112 MG/DL (ref 70–99)
HCT VFR BLD AUTO: 35.4 %
HCT VFR BLD CALC: 34 %
HGB BLD-MCNC: 11.6 G/DL
ISTAT IONIZED CALCIUM FOR CHEM 8: 1.14 MMOL/L (ref 1.12–1.32)
LIPASE SERPL-CCNC: 115 U/L (ref 73–393)
LYMPHOCYTES # BLD AUTO: 2.5 X10ˆ3/UL (ref 1–4)
LYMPHOCYTES NFR BLD AUTO: 22.9 %
MCH RBC QN AUTO: 26.5 PG (ref 26–34)
MCHC RBC AUTO-ENTMCNC: 32.8 G/DL (ref 31–37)
MCV RBC AUTO: 80.8 FL (ref 80–100)
MIXED CELL %: 5.7 %
NEUTROPHILS # BLD AUTO: 7.8 X10ˆ3/UL (ref 1.5–7.7)
NEUTROPHILS NFR BLD AUTO: 71.4 %
PLATELET # BLD AUTO: 326 X10ˆ3/UL (ref 150–450)
POCT BILIRUBIN URINE: NEGATIVE
POCT BLOOD URINE: NEGATIVE
POCT GLUCOSE URINE: NEGATIVE MG/DL
POCT KETONE URINE: NEGATIVE MG/DL
POCT LEUKOCYTE ESTERASE URINE: NEGATIVE
POCT NITRITE URINE: NEGATIVE
POCT PH URINE: 6 (ref 5–8)
POCT PROTEIN URINE: NEGATIVE MG/DL
POCT SPECIFIC GRAVITY URINE: 1.02
POCT URINE CLARITY: CLEAR
POCT URINE COLOR: YELLOW
POCT UROBILINOGEN URINE: 0.2 MG/DL
POTASSIUM BLD-SCNC: 4.4 MMOL/L (ref 3.6–5.1)
RBC # BLD AUTO: 4.38 X10ˆ6/UL
SODIUM BLD-SCNC: 139 MMOL/L (ref 136–145)
WBC # BLD AUTO: 10.9 X10ˆ3/UL (ref 4–11)

## 2021-08-12 PROCEDURE — 83690 ASSAY OF LIPASE: CPT | Performed by: PHYSICIAN ASSISTANT

## 2021-08-12 PROCEDURE — 81025 URINE PREGNANCY TEST: CPT | Performed by: PHYSICIAN ASSISTANT

## 2021-08-12 PROCEDURE — 85025 COMPLETE CBC W/AUTO DIFF WBC: CPT | Performed by: PHYSICIAN ASSISTANT

## 2021-08-12 PROCEDURE — 81002 URINALYSIS NONAUTO W/O SCOPE: CPT | Performed by: PHYSICIAN ASSISTANT

## 2021-08-12 PROCEDURE — 80047 BASIC METABLC PNL IONIZED CA: CPT | Performed by: PHYSICIAN ASSISTANT

## 2021-08-12 PROCEDURE — 76700 US EXAM ABDOM COMPLETE: CPT | Performed by: PHYSICIAN ASSISTANT

## 2021-08-12 PROCEDURE — 99214 OFFICE O/P EST MOD 30 MIN: CPT | Performed by: PHYSICIAN ASSISTANT

## 2021-08-12 RX ORDER — KETOROLAC TROMETHAMINE 30 MG/ML
30 INJECTION, SOLUTION INTRAMUSCULAR; INTRAVENOUS ONCE
Status: DISCONTINUED | OUTPATIENT
Start: 2021-08-12 | End: 2021-08-12

## 2021-08-12 RX ORDER — MAGNESIUM HYDROXIDE/ALUMINUM HYDROXICE/SIMETHICONE 120; 1200; 1200 MG/30ML; MG/30ML; MG/30ML
30 SUSPENSION ORAL ONCE
Status: COMPLETED | OUTPATIENT
Start: 2021-08-12 | End: 2021-08-12

## 2021-08-12 RX ORDER — LIDOCAINE HYDROCHLORIDE 20 MG/ML
10 SOLUTION OROPHARYNGEAL ONCE
Status: COMPLETED | OUTPATIENT
Start: 2021-08-12 | End: 2021-08-12

## 2021-08-12 RX ORDER — SODIUM CHLORIDE 9 MG/ML
INJECTION, SOLUTION INTRAVENOUS ONCE
Status: DISCONTINUED | OUTPATIENT
Start: 2021-08-12 | End: 2021-08-12
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
## 2021-08-12 NOTE — ED INITIAL ASSESSMENT (HPI)
Pt with c/o left upper abdominal pain and nausea. Pt states pain has been ongoing and follows up with a gastroenterologist.  Pt states noticed hard lump to left side one week ago. Pt states saw pmd yesterday and has an outpatient ultrasound ordered.   Pt

## 2021-08-12 NOTE — ED QUICK NOTES
Pt is concerned about possible pregnancy. Pt refused both toradol and ct. Pt states would prefer to follow up with pmd after waiting to verify pregnancy status.   Provider aware

## 2021-10-05 ENCOUNTER — APPOINTMENT (OUTPATIENT)
Dept: GENERAL RADIOLOGY | Age: 36
End: 2021-10-05
Attending: NURSE PRACTITIONER
Payer: COMMERCIAL

## 2021-10-05 ENCOUNTER — HOSPITAL ENCOUNTER (OUTPATIENT)
Age: 36
Discharge: HOME | End: 2021-10-05
Payer: COMMERCIAL

## 2021-10-05 VITALS
TEMPERATURE: 98 F | WEIGHT: 290 LBS | HEART RATE: 71 BPM | DIASTOLIC BLOOD PRESSURE: 81 MMHG | SYSTOLIC BLOOD PRESSURE: 136 MMHG | BODY MASS INDEX: 43.95 KG/M2 | HEIGHT: 68 IN | RESPIRATION RATE: 18 BRPM | OXYGEN SATURATION: 98 %

## 2021-10-05 DIAGNOSIS — R06.02 SOB (SHORTNESS OF BREATH): ICD-10-CM

## 2021-10-05 DIAGNOSIS — J45.901 ASTHMA EXACERBATION, MILD: Primary | ICD-10-CM

## 2021-10-05 DIAGNOSIS — B34.8 RHINOVIRUS: ICD-10-CM

## 2021-10-05 PROCEDURE — 93000 ELECTROCARDIOGRAM COMPLETE: CPT | Performed by: NURSE PRACTITIONER

## 2021-10-05 PROCEDURE — 84484 ASSAY OF TROPONIN QUANT: CPT | Performed by: NURSE PRACTITIONER

## 2021-10-05 PROCEDURE — 85378 FIBRIN DEGRADE SEMIQUANT: CPT | Performed by: NURSE PRACTITIONER

## 2021-10-05 PROCEDURE — 85025 COMPLETE CBC W/AUTO DIFF WBC: CPT | Performed by: NURSE PRACTITIONER

## 2021-10-05 PROCEDURE — 99214 OFFICE O/P EST MOD 30 MIN: CPT | Performed by: NURSE PRACTITIONER

## 2021-10-05 PROCEDURE — 80047 BASIC METABLC PNL IONIZED CA: CPT | Performed by: NURSE PRACTITIONER

## 2021-10-05 PROCEDURE — 94640 AIRWAY INHALATION TREATMENT: CPT | Performed by: NURSE PRACTITIONER

## 2021-10-05 PROCEDURE — 71046 X-RAY EXAM CHEST 2 VIEWS: CPT | Performed by: NURSE PRACTITIONER

## 2021-10-05 RX ORDER — BENZONATATE 100 MG/1
100 CAPSULE ORAL 3 TIMES DAILY PRN
Qty: 30 CAPSULE | Refills: 0 | Status: SHIPPED | OUTPATIENT
Start: 2021-10-05 | End: 2021-11-04

## 2021-10-05 RX ORDER — ALBUTEROL SULFATE 2.5 MG/3ML
2.5 SOLUTION RESPIRATORY (INHALATION) ONCE
Status: COMPLETED | OUTPATIENT
Start: 2021-10-05 | End: 2021-10-05

## 2021-10-05 RX ORDER — PREDNISONE 20 MG/1
60 TABLET ORAL ONCE
Status: COMPLETED | OUTPATIENT
Start: 2021-10-05 | End: 2021-10-05

## 2021-10-05 RX ORDER — PREDNISONE 20 MG/1
20 TABLET ORAL 2 TIMES DAILY
Qty: 8 TABLET | Refills: 0 | Status: SHIPPED | OUTPATIENT
Start: 2021-10-06 | End: 2021-10-10

## 2021-10-05 NOTE — ED QUICK NOTES
Albuterol NEB finished, Pt states she is \"feeling better, just jittery from the NEB. \"  Ambulates to washroom without complications.

## 2021-10-05 NOTE — ED PROVIDER NOTES
Patient Seen in: Immediate [address] [facility]      History   Patient presents with:  Difficulty Breathing  Cough/URI    Stated Complaint: shortness of breath/asthma    Subjective:   43-year-old female who presents to the IC with complaints of asthma flareup.   P Itch of skin    • Leaking of urine     Stress incontinence   • Leg swelling    • Loss of appetite    • Menses painful    • Migraines    • Moderate dysplasia of cervix 2006    COLPO   • MTHFR (methylene THF reductase) deficiency and homocystinuria ([address] HENT: Positive for congestion. Respiratory: Positive for cough and wheezing. Cardiovascular: Negative. Gastrointestinal: Negative. All other systems reviewed and are negative.       Positive for stated complaint: shortness of breath/asthma  Ot the following components:       Result Value    HGB IC 10.8 (*)     HCT IC 33.6 (*)     MCV IC 78.5 (*)     MCH IC 25.2 (*)     All other components within normal limits   POCT ISTAT CHEM8 CARTRIDGE - Abnormal; Notable for the following components:    ISTA to auscultation bilateral patient is feeling much better just slightly jittery but mostly that is due to the steroids. Patient was advised to continue use inhaler every 4 hours as needed, start steroids tomorrow.   Go to emergency room if she develops any

## 2021-10-05 NOTE — ED INITIAL ASSESSMENT (HPI)
Saturday Pt c/o productive cough, chills, chest pressure-worse with deep breaths. Pt had a video visit with PCP 10/4 and +rhino virus, Neg COVID.     Denies: fevers

## 2021-10-22 ENCOUNTER — TELEPHONE (OUTPATIENT)
Dept: NEUROLOGY | Facility: CLINIC | Age: 36
End: 2021-10-22

## 2021-10-22 DIAGNOSIS — G43.709 CHRONIC MIGRAINE W/O AURA, NOT INTRACTABLE, W/O STAT MIGR: Primary | ICD-10-CM

## 2021-10-22 NOTE — TELEPHONE ENCOUNTER
Acute Migraine assessment:    Is this your typical migraine? Describe any change in character from past migraines.   Yes:     Location of Pain (select all that apply):  right side, forehead, temple and behind eyes  # Days pain has been present:  1    Descr

## 2021-10-22 NOTE — TELEPHONE ENCOUNTER
pt is calling in regarding the migraines, pt is stating her migraines is not responding to her emergency meds and she is wanting to know what else could she do at this point?  pls advise

## 2021-10-25 RX ORDER — KETOROLAC TROMETHAMINE 10 MG/1
10 TABLET, FILM COATED ORAL EVERY 6 HOURS PRN
Qty: 20 TABLET | Refills: 0 | Status: SHIPPED | OUTPATIENT
Start: 2021-10-25

## 2021-10-25 RX ORDER — UBROGEPANT 100 MG/1
TABLET ORAL
Qty: 10 TABLET | Refills: 0 | Status: SHIPPED | OUTPATIENT
Start: 2021-10-25 | End: 2021-12-06

## 2021-10-25 NOTE — TELEPHONE ENCOUNTER
Rx Ubrelvy 100mg and Rx Toradol 10mg routed to provider for review and approval before calling patient.     MD MICHAEL Henderson Nurse  Caller: Unspecified (3 days ago,  3:21 PM)  Patient just took Prednisone earlier this month so will av

## 2021-10-30 ENCOUNTER — APPOINTMENT (OUTPATIENT)
Dept: GENERAL RADIOLOGY | Age: 36
End: 2021-10-30
Attending: PHYSICIAN ASSISTANT
Payer: COMMERCIAL

## 2021-10-30 ENCOUNTER — HOSPITAL ENCOUNTER (OUTPATIENT)
Age: 36
Discharge: HOME | End: 2021-10-30
Payer: COMMERCIAL

## 2021-10-30 VITALS
WEIGHT: 290 LBS | OXYGEN SATURATION: 98 % | RESPIRATION RATE: 16 BRPM | DIASTOLIC BLOOD PRESSURE: 71 MMHG | HEIGHT: 68 IN | HEART RATE: 75 BPM | SYSTOLIC BLOOD PRESSURE: 134 MMHG | BODY MASS INDEX: 43.95 KG/M2 | TEMPERATURE: 99 F

## 2021-10-30 DIAGNOSIS — S00.03XA CONTUSION OF SCALP, INITIAL ENCOUNTER: ICD-10-CM
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
## 2021-10-30 DIAGNOSIS — S13.4XXA WHIPLASH INJURY TO NECK, INITIAL ENCOUNTER: ICD-10-CM

## 2021-10-30 PROCEDURE — 99213 OFFICE O/P EST LOW 20 MIN: CPT | Performed by: PHYSICIAN ASSISTANT

## 2021-10-30 PROCEDURE — 72050 X-RAY EXAM NECK SPINE 4/5VWS: CPT | Performed by: PHYSICIAN ASSISTANT

## 2021-10-30 RX ORDER — NAPROXEN 500 MG/1
500 TABLET ORAL 2 TIMES DAILY PRN
Qty: 20 TABLET | Refills: 0 | Status: SHIPPED | OUTPATIENT
Start: 2021-10-30 | End: 2021-11-06

## 2021-10-30 RX ORDER — METHOCARBAMOL 750 MG/1
750 TABLET, FILM COATED ORAL 3 TIMES DAILY
Qty: 21 TABLET | Refills: 0 | Status: SHIPPED | OUTPATIENT
Start: 2021-10-30
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
## 2021-11-02 PROBLEM — S80.12XA CONTUSION, KNEE AND LOWER LEG, LEFT, INITIAL ENCOUNTER: Status: ACTIVE | Noted: 2021-11-02

## 2021-11-02 PROBLEM — S40.012A CONTUSION OF LEFT SHOULDER, INITIAL ENCOUNTER: Status: ACTIVE | Noted: 2021-11-02

## 2021-11-02 PROBLEM — S80.02XA CONTUSION, KNEE AND LOWER LEG, LEFT, INITIAL ENCOUNTER: Status: ACTIVE | Noted: 2021-11-02

## 2021-11-11 DIAGNOSIS — G43.709 CHRONIC MIGRAINE W/O AURA, NOT INTRACTABLE, W/O STAT MIGR: ICD-10-CM

## 2021-11-11 DIAGNOSIS — G43.909 MIXED MIGRAINE AND MUSCLE CONTRACTION HEADACHE: ICD-10-CM

## 2021-11-11 DIAGNOSIS — G44.209 MIXED MIGRAINE AND MUSCLE CONTRACTION HEADACHE: ICD-10-CM

## 2021-11-11 RX ORDER — ERENUMAB-AOOE 70 MG/ML
INJECTION SUBCUTANEOUS
Qty: 1 ML | Refills: 2 | Status: SHIPPED | OUTPATIENT
Start: 2021-11-11

## 2021-11-11 NOTE — TELEPHONE ENCOUNTER
Medication: AIMOVIG 70 MG/ML Subcutaneous     Date of last refill: 06/18/2021 (#1 each /2)  [place]     Last office visit: 03/03/2021  Due back to clinic per last office note:  6  months  Date next office visit sched

## 2021-12-05 DIAGNOSIS — G43.709 CHRONIC MIGRAINE W/O AURA, NOT INTRACTABLE, W/O STAT MIGR: ICD-10-CM

## 2021-12-06 RX ORDER — UBROGEPANT 100 MG/1
TABLET ORAL
Qty: 10 TABLET | Refills: 0 | Status: SHIPPED | OUTPATIENT
Start: 2021-12-06

## 2021-12-06 NOTE — TELEPHONE ENCOUNTER
Medication: UBRELVY 100 MG Oral Tab     Date of last refill: 10/25/2021 (#10/0)  [place]     Last office visit: 03/03/2021  Due back to clinic per last office note:  Around 09/03/2021  Date next office visit schedule

## 2022-01-03 ENCOUNTER — HOSPITAL ENCOUNTER (OUTPATIENT)
Dept: GENERAL RADIOLOGY | Age: 37
Discharge: HOME | End: 2022-01-03
Attending: PODIATRIST
Payer: COMMERCIAL

## 2022-01-03 DIAGNOSIS — M79.671 PAIN OF RIGHT FOOT: ICD-10-CM

## 2022-01-03 PROCEDURE — 73630 X-RAY EXAM OF FOOT: CPT | Performed by: PODIATRIST

## 2022-01-21 PROBLEM — M75.42 IMPINGEMENT SYNDROME OF LEFT SHOULDER: Status: ACTIVE | Noted: 2022-01-21

## 2022-02-18 ENCOUNTER — TELEPHONE (OUTPATIENT)
Dept: NEUROLOGY | Facility: CLINIC | Age: 37
End: 2022-02-18

## 2022-02-22 RX ORDER — ERENUMAB-AOOE 70 MG/ML
INJECTION SUBCUTANEOUS
Qty: 1 ML | Refills: 2 | Status: SHIPPED | OUTPATIENT
Start: 2022-02-22 | End: 2022-04-01

## 2022-02-27 NOTE — TELEPHONE ENCOUNTER
Received fax that Relpax is not covered by patient's plan. Eletriptan is. However, per Dr Grider Schools note of yesterday:  Headaches remains stable but having issues with Relpax.  [facility] pharmacy gave her generic Relpax which does not work as well as the b No

## 2022-03-30 ENCOUNTER — HOSPITAL ENCOUNTER (OUTPATIENT)
Age: 37
Discharge: HOME | End: 2022-03-30
Payer: COMMERCIAL

## 2022-03-30 ENCOUNTER — HOSPITAL ENCOUNTER (EMERGENCY)
Age: 37
Discharge: HOME | End: 2022-03-30
Attending: EMERGENCY MEDICINE
Payer: COMMERCIAL

## 2022-03-30 ENCOUNTER — APPOINTMENT (OUTPATIENT)
Dept: ULTRASOUND IMAGING | Age: 37
End: 2022-03-30
Attending: EMERGENCY MEDICINE
Payer: COMMERCIAL

## 2022-03-30 ENCOUNTER — APPOINTMENT (OUTPATIENT)
Dept: GENERAL RADIOLOGY | Age: 37
End: 2022-03-30
Attending: EMERGENCY MEDICINE
Payer: COMMERCIAL

## 2022-03-30 VITALS
DIASTOLIC BLOOD PRESSURE: 90 MMHG | OXYGEN SATURATION: 98 % | TEMPERATURE: 97 F | HEIGHT: 68 IN | RESPIRATION RATE: 16 BRPM | HEART RATE: 80 BPM | BODY MASS INDEX: 44.41 KG/M2 | WEIGHT: 293 LBS | SYSTOLIC BLOOD PRESSURE: 139 MMHG

## 2022-03-30 VITALS
DIASTOLIC BLOOD PRESSURE: 84 MMHG | HEART RATE: 84 BPM | TEMPERATURE: 99 F | SYSTOLIC BLOOD PRESSURE: 147 MMHG | OXYGEN SATURATION: 98 % | RESPIRATION RATE: 16 BRPM

## 2022-03-30 DIAGNOSIS — M79.661 RIGHT CALF PAIN: ICD-10-CM

## 2022-03-30 DIAGNOSIS — M79.89 PAIN AND SWELLING OF LOWER LEG, RIGHT: Primary | ICD-10-CM

## 2022-03-30 DIAGNOSIS — M79.661 PAIN AND SWELLING OF LOWER LEG, RIGHT: Primary | ICD-10-CM

## 2022-03-30 DIAGNOSIS — R07.9 CHEST PAIN OF UNCERTAIN ETIOLOGY: Primary | ICD-10-CM

## 2022-03-30 DIAGNOSIS — R06.02 SHORTNESS OF BREATH: ICD-10-CM

## 2022-03-30 LAB
ALBUMIN SERPL-MCNC: 3.3 G/DL (ref 3.4–5)
ALBUMIN/GLOB SERPL: 0.9 {RATIO} (ref 1–2)
ALP LIVER SERPL-CCNC: 104 U/L
ALT SERPL-CCNC: 18 U/L
ANION GAP SERPL CALC-SCNC: 7 MMOL/L (ref 0–18)
AST SERPL-CCNC: 12 U/L (ref 15–37)
BASOPHILS # BLD AUTO: 0.06 X10(3) UL (ref 0–0.2)
BASOPHILS NFR BLD AUTO: 0.6 %
BILIRUB SERPL-MCNC: 0.2 MG/DL (ref 0.1–2)
BUN BLD-MCNC: 3 MG/DL (ref 7–18)
CALCIUM BLD-MCNC: 8.6 MG/DL (ref 8.5–10.1)
CHLORIDE SERPL-SCNC: 108 MMOL/L (ref 98–112)
CO2 SERPL-SCNC: 23 MMOL/L (ref 21–32)
CREAT BLD-MCNC: 0.85 MG/DL
D DIMER PPP FEU-MCNC: 0.31 UG/ML FEU (ref ?–0.5)
EOSINOPHIL # BLD AUTO: 0.19 X10(3) UL (ref 0–0.7)
EOSINOPHIL NFR BLD AUTO: 1.9 %
ERYTHROCYTE [DISTWIDTH] IN BLOOD BY AUTOMATED COUNT: 16.6 %
GLOBULIN PLAS-MCNC: 3.7 G/DL (ref 2.8–4.4)
GLUCOSE BLD-MCNC: 122 MG/DL (ref 70–99)
HCT VFR BLD AUTO: 34.8 %
HGB BLD-MCNC: 11 G/DL
IMM GRANULOCYTES # BLD AUTO: 0.02 X10(3) UL (ref 0–1)
IMM GRANULOCYTES NFR BLD: 0.2 %
LYMPHOCYTES # BLD AUTO: 3.49 X10(3) UL (ref 1–4)
LYMPHOCYTES NFR BLD AUTO: 35.6 %
MCH RBC QN AUTO: 23.2 PG (ref 26–34)
MCHC RBC AUTO-ENTMCNC: 31.6 G/DL (ref 31–37)
MCV RBC AUTO: 73.4 FL
MONOCYTES # BLD AUTO: 0.44 X10(3) UL (ref 0.1–1)
MONOCYTES NFR BLD AUTO: 4.5 %
NEUTROPHILS # BLD AUTO: 5.61 X10 (3) UL (ref 1.5–7.7)
NEUTROPHILS # BLD AUTO: 5.61 X10(3) UL (ref 1.5–7.7)
NEUTROPHILS NFR BLD AUTO: 57.2 %
OSMOLALITY SERPL CALC.SUM OF ELEC: 284 MOSM/KG (ref 275–295)
PLATELET # BLD AUTO: 325 10(3)UL (ref 150–450)
POTASSIUM SERPL-SCNC: 4.1 MMOL/L (ref 3.5–5.1)
PROT SERPL-MCNC: 7 G/DL (ref 6.4–8.2)
RBC # BLD AUTO: 4.74 X10(6)UL
SODIUM SERPL-SCNC: 138 MMOL/L (ref 136–145)
TROPONIN I HIGH SENSITIVITY: 5 NG/L
WBC # BLD AUTO: 9.8 X10(3) UL (ref 4–11)

## 2022-03-30 PROCEDURE — 85379 FIBRIN DEGRADATION QUANT: CPT | Performed by: EMERGENCY MEDICINE

## 2022-03-30 PROCEDURE — 84484 ASSAY OF TROPONIN QUANT: CPT | Performed by: EMERGENCY MEDICINE

## 2022-03-30 PROCEDURE — 36415 COLL VENOUS BLD VENIPUNCTURE: CPT

## 2022-03-30 PROCEDURE — 99284 EMERGENCY DEPT VISIT MOD MDM: CPT

## 2022-03-30 PROCEDURE — 99215 OFFICE O/P EST HI 40 MIN: CPT | Performed by: NURSE PRACTITIONER

## 2022-03-30 PROCEDURE — 93010 ELECTROCARDIOGRAM REPORT: CPT

## 2022-03-30 PROCEDURE — 71045 X-RAY EXAM CHEST 1 VIEW: CPT | Performed by: EMERGENCY MEDICINE

## 2022-03-30 PROCEDURE — 85025 COMPLETE CBC W/AUTO DIFF WBC: CPT | Performed by: EMERGENCY MEDICINE

## 2022-03-30 PROCEDURE — 93005 ELECTROCARDIOGRAM TRACING: CPT

## 2022-03-30 PROCEDURE — 93971 EXTREMITY STUDY: CPT | Performed by: EMERGENCY MEDICINE

## 2022-03-30 PROCEDURE — 80053 COMPREHEN METABOLIC PANEL: CPT | Performed by: EMERGENCY MEDICINE

## 2022-03-30 RX ORDER — ALBUTEROL SULFATE 90 UG/1
2 AEROSOL, METERED RESPIRATORY (INHALATION) EVERY 4 HOURS PRN
Qty: 1 EACH | Refills: 0 | Status: SHIPPED | OUTPATIENT
Start: 2022-03-30 | End: 2022-04-29

## 2022-03-30 NOTE — ED INITIAL ASSESSMENT (HPI)
Pt here for swelling and pain to rt lower leg x 2 days. Pt sent from [facility] to r/o DVT. Pt denies chest pain but states she has been SOB for the past several months prior to leg swelling and denies injury/trauma.   Pt has no hx of dvt in past.

## 2022-03-30 NOTE — ED INITIAL ASSESSMENT (HPI)
Pt reports pain and a lump to her right lateral calf since Monday. Denies any excessive activity or long car ride. Denies any hx of blood clot but states she is at risk. Does not take her recommended aspirin.

## 2022-03-31 LAB
ATRIAL RATE: 83 BPM
P AXIS: 53 DEGREES
P-R INTERVAL: 126 MS
QRS DURATION: 82 MS
QTC CALCULATION (BEZET): 432 MS
R AXIS: 67 DEGREES
T AXIS: 39 DEGREES
VENTRICULAR RATE: 83 BPM

## 2022-04-01 ENCOUNTER — OFFICE VISIT (OUTPATIENT)
Dept: NEUROLOGY | Facility: CLINIC | Age: 37
End: 2022-04-01
Payer: COMMERCIAL

## 2022-04-01 VITALS
HEART RATE: 90 BPM | DIASTOLIC BLOOD PRESSURE: 78 MMHG | RESPIRATION RATE: 16 BRPM | SYSTOLIC BLOOD PRESSURE: 128 MMHG | WEIGHT: 293 LBS | BODY MASS INDEX: 47 KG/M2

## 2022-04-01 DIAGNOSIS — G43.909 MIXED MIGRAINE AND MUSCLE CONTRACTION HEADACHE: ICD-10-CM

## 2022-04-01 DIAGNOSIS — G43.709 CHRONIC MIGRAINE W/O AURA, NOT INTRACTABLE, W/O STAT MIGR: Primary | ICD-10-CM

## 2022-04-01 DIAGNOSIS — G44.209 MIXED MIGRAINE AND MUSCLE CONTRACTION HEADACHE: ICD-10-CM

## 2022-04-01 PROCEDURE — 3074F SYST BP LT 130 MM HG: CPT | Performed by: OTHER

## 2022-04-01 PROCEDURE — 99213 OFFICE O/P EST LOW 20 MIN: CPT | Performed by: OTHER

## 2022-04-01 PROCEDURE — 3078F DIAST BP <80 MM HG: CPT | Performed by: OTHER

## 2022-04-01 RX ORDER — ERENUMAB-AOOE 70 MG/ML
70 INJECTION SUBCUTANEOUS
Qty: 1 ML | Refills: 5 | Status: SHIPPED | OUTPATIENT
Start: 2022-04-01

## 2022-04-01 RX ORDER — UBROGEPANT 100 MG/1
TABLET ORAL
Qty: 10 TABLET | Refills: 5 | Status: SHIPPED | OUTPATIENT
Start: 2022-04-01

## 2022-04-04 ENCOUNTER — HOSPITAL ENCOUNTER (OUTPATIENT)
Age: 37
Discharge: HOME | End: 2022-04-04
Payer: COMMERCIAL

## 2022-04-04 VITALS
TEMPERATURE: 98 F | HEART RATE: 83 BPM | DIASTOLIC BLOOD PRESSURE: 82 MMHG | OXYGEN SATURATION: 99 % | SYSTOLIC BLOOD PRESSURE: 142 MMHG | RESPIRATION RATE: 18 BRPM

## 2022-04-04 DIAGNOSIS — K11.20 SIALOADENITIS: Primary | ICD-10-CM

## 2022-04-04 PROCEDURE — 99213 OFFICE O/P EST LOW 20 MIN: CPT | Performed by: NURSE PRACTITIONER

## 2022-04-04 RX ORDER — CEPHALEXIN 500 MG/1
500 CAPSULE ORAL 4 TIMES DAILY
Qty: 40 CAPSULE | Refills: 0 | Status: SHIPPED | OUTPATIENT
Start: 2022-04-04 | End: 2022-04-14

## 2022-04-04 NOTE — ED INITIAL ASSESSMENT (HPI)
Today, pt noticed the left salivary gland painful and swollen. Pain radiates up to the left ear. Denies fevers.

## 2022-04-27 ENCOUNTER — TELEPHONE (OUTPATIENT)
Dept: NEUROLOGY | Facility: CLINIC | Age: 37
End: 2022-04-27

## 2022-04-27 NOTE — TELEPHONE ENCOUNTER
Received fax from [facility] to renew PA for Ubrelvy 50 MG tablets. Requested PA through Epic. Questions answered. Will await determination.

## 2022-06-04 ENCOUNTER — APPOINTMENT (OUTPATIENT)
Dept: GENERAL RADIOLOGY | Age: 37
End: 2022-06-04
Attending: PHYSICIAN ASSISTANT
Payer: COMMERCIAL

## 2022-06-04 ENCOUNTER — HOSPITAL ENCOUNTER (OUTPATIENT)
Age: 37
Discharge: HOME | End: 2022-06-04
Payer: COMMERCIAL

## 2022-06-04 VITALS
HEART RATE: 82 BPM | WEIGHT: 293 LBS | HEIGHT: 68 IN | SYSTOLIC BLOOD PRESSURE: 139 MMHG | TEMPERATURE: 97 F | BODY MASS INDEX: 44.41 KG/M2 | DIASTOLIC BLOOD PRESSURE: 72 MMHG | RESPIRATION RATE: 20 BRPM | OXYGEN SATURATION: 99 %

## 2022-06-04 DIAGNOSIS — M62.838 NECK MUSCLE SPASM: Primary | ICD-10-CM

## 2022-06-04 DIAGNOSIS — M54.12 CERVICAL RADICULOPATHY: ICD-10-CM

## 2022-06-04 LAB — B-HCG UR QL: NEGATIVE

## 2022-06-04 PROCEDURE — 99213 OFFICE O/P EST LOW 20 MIN: CPT | Performed by: PHYSICIAN ASSISTANT

## 2022-06-04 PROCEDURE — 72050 X-RAY EXAM NECK SPINE 4/5VWS: CPT | Performed by: PHYSICIAN ASSISTANT

## 2022-06-04 PROCEDURE — 81025 URINE PREGNANCY TEST: CPT | Performed by: PHYSICIAN ASSISTANT

## 2022-06-04 RX ORDER — NAPROXEN 500 MG/1
500 TABLET ORAL 2 TIMES DAILY PRN
Qty: 20 TABLET | Refills: 0 | Status: SHIPPED | OUTPATIENT
Start: 2022-06-04 | End: 2022-06-11

## 2022-06-04 RX ORDER — CYCLOBENZAPRINE HCL 10 MG
10 TABLET ORAL 3 TIMES DAILY PRN
Qty: 10 TABLET | Refills: 0 | Status: SHIPPED | OUTPATIENT
Start: 2022-06-04 | End: 2022-06-11

## 2022-06-04 NOTE — ED INITIAL ASSESSMENT (HPI)
Pt here c/o lt neck pain while sitting at the couch when pain started while watching tv. Has noticed tingling/coolness to lt hand. Pain on movement to neck radiating pain down left arm. Some numbness to lt 4th and 5th fingers.

## 2022-06-20 ENCOUNTER — TELEPHONE (OUTPATIENT)
Dept: NEUROLOGY | Facility: CLINIC | Age: 37
End: 2022-06-20

## 2022-10-26 DIAGNOSIS — G43.709 CHRONIC MIGRAINE W/O AURA, NOT INTRACTABLE, W/O STAT MIGR: ICD-10-CM

## 2022-10-26 RX ORDER — UBROGEPANT 100 MG/1
TABLET ORAL
Qty: 10 TABLET | Refills: 2 | Status: SHIPPED | OUTPATIENT
Start: 2022-10-26

## 2022-11-21 DIAGNOSIS — G44.209 MIXED MIGRAINE AND MUSCLE CONTRACTION HEADACHE: ICD-10-CM

## 2022-11-21 DIAGNOSIS — G43.709 CHRONIC MIGRAINE W/O AURA, NOT INTRACTABLE, W/O STAT MIGR: ICD-10-CM

## 2022-11-21 DIAGNOSIS — G43.909 MIXED MIGRAINE AND MUSCLE CONTRACTION HEADACHE: ICD-10-CM

## 2022-11-22 RX ORDER — ERENUMAB-AOOE 70 MG/ML
INJECTION SUBCUTANEOUS
Qty: 1 ML | Refills: 5 | Status: SHIPPED | OUTPATIENT
Start: 2022-11-22

## 2023-01-24 DIAGNOSIS — G43.709 CHRONIC MIGRAINE W/O AURA, NOT INTRACTABLE, W/O STAT MIGR: ICD-10-CM

## 2023-01-25 RX ORDER — UBROGEPANT 100 MG/1
TABLET ORAL
Qty: 10 TABLET | Refills: 0 | Status: SHIPPED | OUTPATIENT
Start: 2023-01-25

## 2023-01-25 RX ORDER — UBROGEPANT 100 MG/1
TABLET ORAL
Qty: 10 TABLET | Refills: 2 | Status: SHIPPED | OUTPATIENT
Start: 2023-01-25

## 2023-05-06 ENCOUNTER — HOSPITAL ENCOUNTER (OUTPATIENT)
Age: 38
Discharge: HOME | End: 2023-05-06
Payer: COMMERCIAL

## 2023-05-06 VITALS
HEIGHT: 68 IN | DIASTOLIC BLOOD PRESSURE: 71 MMHG | TEMPERATURE: 98 F | RESPIRATION RATE: 18 BRPM | WEIGHT: 273 LBS | HEART RATE: 112 BPM | OXYGEN SATURATION: 97 % | BODY MASS INDEX: 41.37 KG/M2 | SYSTOLIC BLOOD PRESSURE: 130 MMHG

## 2023-05-06 DIAGNOSIS — M54.31 SCIATIC PAIN, RIGHT: Primary | ICD-10-CM

## 2023-05-06 PROCEDURE — 99213 OFFICE O/P EST LOW 20 MIN: CPT | Performed by: NURSE PRACTITIONER

## 2023-05-06 RX ORDER — CYCLOBENZAPRINE HCL 10 MG
10 TABLET ORAL 3 TIMES DAILY PRN
Qty: 20 TABLET | Refills: 0 | Status: SHIPPED | OUTPATIENT
Start: 2023-05-06 | End: 2023-05-13

## 2023-05-06 RX ORDER — METHYLPREDNISOLONE 4 MG/1
TABLET ORAL
Qty: 1 EACH | Refills: 0 | Status: SHIPPED | OUTPATIENT
Start: 2023-05-06

## 2023-05-06 NOTE — ED INITIAL ASSESSMENT (HPI)
Pt c/o right lower back pain x 1 week. Pain does not radiate. Limits her ROM and is uncomfortable in certain positions.

## 2023-05-20 DIAGNOSIS — G44.209 MIXED MIGRAINE AND MUSCLE CONTRACTION HEADACHE: ICD-10-CM

## 2023-05-20 DIAGNOSIS — G43.709 CHRONIC MIGRAINE W/O AURA, NOT INTRACTABLE, W/O STAT MIGR: ICD-10-CM

## 2023-05-20 DIAGNOSIS — G43.909 MIXED MIGRAINE AND MUSCLE CONTRACTION HEADACHE: ICD-10-CM

## 2023-05-22 RX ORDER — ERENUMAB-AOOE 70 MG/ML
INJECTION SUBCUTANEOUS
Qty: 1 ML | Refills: 5 | Status: SHIPPED | OUTPATIENT
Start: 2023-05-22

## 2023-05-23 RX ORDER — ERENUMAB-AOOE 70 MG/ML
INJECTION SUBCUTANEOUS
Qty: 1 ML | Refills: 5 | OUTPATIENT
Start: 2023-05-23

## 2023-05-24 ENCOUNTER — LAB ENCOUNTER (OUTPATIENT)
Dept: LAB | Age: 38
End: 2023-05-24
Payer: COMMERCIAL

## 2023-05-24 DIAGNOSIS — D64.9 ANEMIA, UNSPECIFIED TYPE: ICD-10-CM

## 2023-05-24 LAB
DEPRECATED HBV CORE AB SER IA-ACNC: 4.8 NG/ML
IRON SATN MFR SERPL: 4 %
IRON SERPL-MCNC: 22 UG/DL
TIBC SERPL-MCNC: 496 UG/DL (ref 240–450)
TRANSFERRIN SERPL-MCNC: 333 MG/DL (ref 200–360)

## 2023-05-24 PROCEDURE — 36415 COLL VENOUS BLD VENIPUNCTURE: CPT

## 2023-05-24 PROCEDURE — 83550 IRON BINDING TEST: CPT

## 2023-05-24 PROCEDURE — 83540 ASSAY OF IRON: CPT

## 2023-05-24 PROCEDURE — 82728 ASSAY OF FERRITIN: CPT

## 2023-05-25 DIAGNOSIS — D50.8 OTHER IRON DEFICIENCY ANEMIA: Primary | ICD-10-CM

## 2023-05-25 RX ORDER — POLYETHYLENE GLYCOL 3350, SODIUM CHLORIDE, SODIUM BICARBONATE, POTASSIUM CHLORIDE 420; 11.2; 5.72; 1.48 G/4L; G/4L; G/4L; G/4L
POWDER, FOR SOLUTION ORAL
Qty: 4000 ML | Refills: 0 | Status: SHIPPED | OUTPATIENT
Start: 2023-05-25

## 2023-05-25 NOTE — PROGRESS NOTES
Results were reviewed and discussed with the patient over the phone. Labs showed BRYANNA. Recommend adding a colonoscopy to the already scheduled EGD. Also, to start Ferrus sulfate 325 mg daily and Vitamin C 1000 mg daily. Discussed proceeding with stool studies. The patient will collect it specimen next week. Also, to follow up after the procedures. Plan for EGD and Colonoscopy with MAC at [facility]. The risks, benefits, alternatives of the procedure including the risks of anesthesia, bleeding, perforation, missed lesions, need for surgery were discussed with the patient. The patient expressed understanding of the plan.   Peg sent to the pharmacy

## 2023-06-05 ENCOUNTER — HOSPITAL ENCOUNTER (OUTPATIENT)
Age: 38
Discharge: HOME | End: 2023-06-05
Payer: COMMERCIAL

## 2023-06-05 VITALS
WEIGHT: 230 LBS | SYSTOLIC BLOOD PRESSURE: 140 MMHG | DIASTOLIC BLOOD PRESSURE: 88 MMHG | RESPIRATION RATE: 18 BRPM | OXYGEN SATURATION: 98 % | HEART RATE: 110 BPM | TEMPERATURE: 98 F | BODY MASS INDEX: 36.1 KG/M2 | HEIGHT: 67 IN

## 2023-06-05 DIAGNOSIS — J98.01 BRONCHOSPASM: Primary | ICD-10-CM

## 2023-06-05 LAB
S PYO AG THROAT QL: NEGATIVE
SARS-COV-2 RNA RESP QL NAA+PROBE: NOT DETECTED

## 2023-06-05 PROCEDURE — 87880 STREP A ASSAY W/OPTIC: CPT | Performed by: NURSE PRACTITIONER

## 2023-06-05 PROCEDURE — 94640 AIRWAY INHALATION TREATMENT: CPT | Performed by: NURSE PRACTITIONER

## 2023-06-05 PROCEDURE — U0002 COVID-19 LAB TEST NON-CDC: HCPCS | Performed by: NURSE PRACTITIONER

## 2023-06-05 PROCEDURE — 99214 OFFICE O/P EST MOD 30 MIN: CPT | Performed by: NURSE PRACTITIONER

## 2023-06-05 RX ORDER — IPRATROPIUM BROMIDE AND ALBUTEROL SULFATE 2.5; .5 MG/3ML; MG/3ML
3 SOLUTION RESPIRATORY (INHALATION) ONCE
Status: COMPLETED | OUTPATIENT
Start: 2023-06-05 | End: 2023-06-05

## 2023-06-05 RX ORDER — BENZONATATE 100 MG/1
100 CAPSULE ORAL 3 TIMES DAILY PRN
Qty: 30 CAPSULE | Refills: 0 | Status: SHIPPED | OUTPATIENT
Start: 2023-06-05 | End: 2023-07-05

## 2023-06-05 RX ORDER — METHYLPREDNISOLONE 4 MG/1
TABLET ORAL
Qty: 1 EACH | Refills: 0 | Status: SHIPPED | OUTPATIENT
Start: 2023-06-05

## 2023-06-05 RX ORDER — ALBUTEROL SULFATE 2.5 MG/3ML
2.5 SOLUTION RESPIRATORY (INHALATION) EVERY 4 HOURS PRN
Qty: 30 EACH | Refills: 0 | Status: SHIPPED | OUTPATIENT
Start: 2023-06-05 | End: 2023-07-05

## 2023-06-05 NOTE — ED INITIAL ASSESSMENT (HPI)
Pt c/o sore throat that started on Saturday with swollen glands. Pt c/o nasal congestion. Cough and saira today.   Pt states neb treatments are

## 2023-06-05 NOTE — DISCHARGE INSTRUCTIONS
Your COVID test and strep test were both negative today. Use albuterol inhaler 2 puffs or albuterol nebulizer treatments every 4-6 hours-as needed, for coughing or wheezing. Take prednisone as directed with food. Do not take Ibuprofen-like products (Motrin, Advil, Aleve, Naproxen)  while taking prednisone. Prednisone will likely increase your blood sugar readings. Please monitor your blood sugar levels while taking this medication. Benzonatate cough pills as directed and as needed. Drinking hot tea with honey may help control your cough  It is okay to rub Vicks vapor rub on your chest.  Rest and drink plenty of fluids  Use a humidifier in your bedroom.       Seek immediate medical attention if you develop fever, increased coughing, shortness of breath, coughing up phlegm, chest pain, weakness

## 2023-06-06 ENCOUNTER — HOSPITAL ENCOUNTER (OUTPATIENT)
Age: 38
Discharge: HOME | End: 2023-06-06
Payer: COMMERCIAL

## 2023-06-06 ENCOUNTER — APPOINTMENT (OUTPATIENT)
Dept: GENERAL RADIOLOGY | Age: 38
End: 2023-06-06
Attending: NURSE PRACTITIONER
Payer: COMMERCIAL

## 2023-06-06 VITALS
TEMPERATURE: 97 F | RESPIRATION RATE: 20 BRPM | DIASTOLIC BLOOD PRESSURE: 79 MMHG | SYSTOLIC BLOOD PRESSURE: 118 MMHG | OXYGEN SATURATION: 97 % | HEART RATE: 98 BPM

## 2023-06-06 DIAGNOSIS — J45.21 MILD INTERMITTENT ASTHMA WITH EXACERBATION: Primary | ICD-10-CM

## 2023-06-06 LAB
#MXD IC: 0.4 X10ˆ3/UL (ref 0.1–1)
ATRIAL RATE: 80 BPM
BUN BLD-MCNC: 5 MG/DL (ref 7–18)
CHLORIDE BLD-SCNC: 106 MMOL/L (ref 98–112)
CO2 BLD-SCNC: 21 MMOL/L (ref 21–32)
CREAT BLD-MCNC: 0.6 MG/DL
DDIMER WHOLE BLOOD: <200 NG/ML DDU (ref ?–400)
GFR SERPLBLD BASED ON 1.73 SQ M-ARVRAT: 118 ML/MIN/1.73M2 (ref 60–?)
GLUCOSE BLD-MCNC: 136 MG/DL (ref 70–99)
HCT VFR BLD AUTO: 33.8 %
HCT VFR BLD CALC: 32 %
HGB BLD-MCNC: 10.5 G/DL
ISTAT IONIZED CALCIUM FOR CHEM 8: 1.22 MMOL/L (ref 1.12–1.32)
LYMPHOCYTES # BLD AUTO: 1.5 X10ˆ3/UL (ref 1–4)
LYMPHOCYTES NFR BLD AUTO: 19.6 %
MCH RBC QN AUTO: 23.1 PG (ref 26–34)
MCHC RBC AUTO-ENTMCNC: 31.1 G/DL (ref 31–37)
MCV RBC AUTO: 74.3 FL (ref 80–100)
MIXED CELL %: 5.3 %
NEUTROPHILS # BLD AUTO: 6 X10ˆ3/UL (ref 1.5–7.7)
NEUTROPHILS NFR BLD AUTO: 75.1 %
P AXIS: 37 DEGREES
P-R INTERVAL: 140 MS
PLATELET # BLD AUTO: 320 X10ˆ3/UL (ref 150–450)
POTASSIUM BLD-SCNC: 3.9 MMOL/L (ref 3.6–5.1)
Q-T INTERVAL: 374 MS
QRS DURATION: 88 MS
QTC CALCULATION (BEZET): 431 MS
R AXIS: 65 DEGREES
RBC # BLD AUTO: 4.55 X10ˆ6/UL
SODIUM BLD-SCNC: 139 MMOL/L (ref 136–145)
T AXIS: 33 DEGREES
VENTRICULAR RATE: 80 BPM
WBC # BLD AUTO: 7.9 X10ˆ3/UL (ref 4–11)

## 2023-06-06 PROCEDURE — 80047 BASIC METABLC PNL IONIZED CA: CPT | Performed by: NURSE PRACTITIONER

## 2023-06-06 PROCEDURE — 71046 X-RAY EXAM CHEST 2 VIEWS: CPT | Performed by: NURSE PRACTITIONER

## 2023-06-06 PROCEDURE — 99213 OFFICE O/P EST LOW 20 MIN: CPT | Performed by: NURSE PRACTITIONER

## 2023-06-06 PROCEDURE — 85378 FIBRIN DEGRADE SEMIQUANT: CPT | Performed by: NURSE PRACTITIONER

## 2023-06-06 PROCEDURE — 85025 COMPLETE CBC W/AUTO DIFF WBC: CPT | Performed by: NURSE PRACTITIONER

## 2023-06-06 PROCEDURE — 94640 AIRWAY INHALATION TREATMENT: CPT | Performed by: NURSE PRACTITIONER

## 2023-06-06 PROCEDURE — 93000 ELECTROCARDIOGRAM COMPLETE: CPT | Performed by: NURSE PRACTITIONER

## 2023-06-06 RX ORDER — ALBUTEROL SULFATE 2.5 MG/3ML
2.5 SOLUTION RESPIRATORY (INHALATION) ONCE
Status: COMPLETED | OUTPATIENT
Start: 2023-06-06 | End: 2023-06-06

## 2023-06-06 NOTE — ED INITIAL ASSESSMENT (HPI)
Patient c/o nasal congestion, swollen glands and sore throat since Saturday. Cough since yesterday. Was seen in [facility] yesterday and had a negative strep and Covid at that time. C/O difficulty breathing that started last night. Last neb treatment at 0700 this morning.

## 2023-06-06 NOTE — ED QUICK NOTES
Patient called from exam room. Hyperventilating. States \"I'm having a panic attack\". Lungs CTA throughout. O2 sat 100% on RA. HR 90's-110's. Reassured patient. David Verduzco at bedside. Will continue to monitor.

## 2023-06-13 ENCOUNTER — TELEPHONE (OUTPATIENT)
Dept: NEUROLOGY | Facility: CLINIC | Age: 38
End: 2023-06-13

## 2023-06-13 NOTE — TELEPHONE ENCOUNTER
Received request from [employer] that PA is expiring for Aimovig. New PA submitting through [employer].

## 2023-07-19 DIAGNOSIS — G43.709 CHRONIC MIGRAINE W/O AURA, NOT INTRACTABLE, W/O STAT MIGR: ICD-10-CM

## 2023-07-20 RX ORDER — UBROGEPANT 100 MG/1
TABLET ORAL
Qty: 10 TABLET | Refills: 0 | Status: SHIPPED | OUTPATIENT
Start: 2023-07-20

## 2023-07-20 RX ORDER — UBROGEPANT 100 MG/1
TABLET ORAL
Qty: 10 TABLET | Refills: 2 | Status: SHIPPED | OUTPATIENT
Start: 2023-07-20

## 2023-07-20 NOTE — TELEPHONE ENCOUNTER
Medication: UBRELVY 100 MG Oral Tab     Date of last refill: 01/25/23 (10/2)  [place] 32/80/42    Last office visit: 04/01/22  Due back to clinic per last office note:  6 months  Date next office visit scheduled:    Future Appointments   Date Time Provider Irma Durán   7/27/2023  1:15 PM Preeti Van MD [facility]   7/27/2023  1:30 PM Preeti Van MD [facility]   11/22/2023 10:00 AM Alejandro Colón MD [facility]        Last OV note recommendation:       PLAN:   Chronic migraine w/o aura, not intractable, w/o stat migr  (primary encounter diagnosis)  Mixed migraine and muscle contraction headache     Mixed tension and migraine headaches. Migraines has improved with Aimovig    Will continue current management     Follow up in about 6 months unless patient move to [place].  She will call us with update

## 2023-07-21 ENCOUNTER — TELEPHONE (OUTPATIENT)
Dept: NEUROLOGY | Facility: CLINIC | Age: 38
End: 2023-07-21

## 2023-07-21 NOTE — TELEPHONE ENCOUNTER
Received request from [employer] [facility] [facility] to complete PA for Ubrelvy    Completed through epic with only 2 questions.      ubrogepant (UBRELVY) 100 MG Oral Tab     Approval Details    Authorized from June 21, 2023 to July 20, 2024   Information received electronically from payer

## 2023-10-18 ENCOUNTER — HOSPITAL ENCOUNTER (OUTPATIENT)
Age: 38
Discharge: HOME | End: 2023-10-18
Payer: COMMERCIAL

## 2023-10-18 ENCOUNTER — APPOINTMENT (OUTPATIENT)
Dept: GENERAL RADIOLOGY | Age: 38
End: 2023-10-18
Attending: NURSE PRACTITIONER
Payer: COMMERCIAL

## 2023-10-18 VITALS
RESPIRATION RATE: 18 BRPM | SYSTOLIC BLOOD PRESSURE: 144 MMHG | TEMPERATURE: 98 F | BODY MASS INDEX: 35.61 KG/M2 | DIASTOLIC BLOOD PRESSURE: 87 MMHG | WEIGHT: 235 LBS | HEART RATE: 80 BPM | HEIGHT: 68 IN | OXYGEN SATURATION: 97 %

## 2023-10-18 DIAGNOSIS — J20.9 ACUTE BRONCHITIS, UNSPECIFIED ORGANISM: Primary | ICD-10-CM

## 2023-10-18 DIAGNOSIS — R05.1 ACUTE COUGH: ICD-10-CM

## 2023-10-18 PROCEDURE — 99213 OFFICE O/P EST LOW 20 MIN: CPT | Performed by: NURSE PRACTITIONER

## 2023-10-18 PROCEDURE — 71046 X-RAY EXAM CHEST 2 VIEWS: CPT | Performed by: NURSE PRACTITIONER

## 2023-10-18 RX ORDER — FLUTICASONE FUROATE AND VILANTEROL TRIFENATATE 100; 25 UG/1; UG/1
1 POWDER RESPIRATORY (INHALATION) DAILY
COMMUNITY

## 2023-10-18 RX ORDER — CODEINE PHOSPHATE AND GUAIFENESIN 10; 100 MG/5ML; MG/5ML
10 SOLUTION ORAL EVERY 6 HOURS PRN
Qty: 180 ML | Refills: 0 | Status: SHIPPED | OUTPATIENT
Start: 2023-10-18

## 2023-10-18 RX ORDER — ASPIRIN 325 MG
325 TABLET ORAL DAILY
COMMUNITY

## 2023-10-18 NOTE — ED INITIAL ASSESSMENT (HPI)
Pt sts last month began with coughing and wheezing. Saw [address] at [facility] 9/17 prescribed steroids and nebs. No improvement, felt worse 3 days later. Called PMD, prescribed antibiotic, symptoms improved but cough persisted. 4 days ago called PMD, prescribed more steroid, nebs. Now cough feels more severe, chest \"feels irritated', SOB with activity. No known fever.

## 2023-10-27 ENCOUNTER — TELEPHONE (OUTPATIENT)
Dept: NEUROLOGY | Facility: CLINIC | Age: 38
End: 2023-10-27

## 2023-10-27 NOTE — TELEPHONE ENCOUNTER
Rec'd incoming fax from pharmacy requesting PA be completed for Aimovig 70mg. Note that a PA currently exists for this medication, valid through 6/12/24, but this was done by [employer], and patient now has [employer] as PBM. Initiated new PA in [address] No clinical questions were presented. Current status is \"Waiting for Payer Response. \"

## 2023-10-31 NOTE — TELEPHONE ENCOUNTER
Received request from [facility] for PA on form [employer]. Completed and faxed with LOV from 04/12/2022 to [employer]. Will await determination.

## 2023-11-01 NOTE — TELEPHONE ENCOUNTER
Aimovig 70mg approved 10/31/2023-10/31/2024. Approval letter faxed to [facility] with fax confirmation received.

## 2023-11-18 ENCOUNTER — HOSPITAL ENCOUNTER (OUTPATIENT)
Age: 38
Discharge: HOME | End: 2023-11-18
Payer: COMMERCIAL

## 2023-11-18 VITALS
WEIGHT: 240 LBS | TEMPERATURE: 98 F | SYSTOLIC BLOOD PRESSURE: 135 MMHG | BODY MASS INDEX: 36 KG/M2 | DIASTOLIC BLOOD PRESSURE: 88 MMHG | HEART RATE: 90 BPM | RESPIRATION RATE: 18 BRPM | OXYGEN SATURATION: 99 %
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
## 2023-11-18 DIAGNOSIS — L03.011 CELLULITIS OF FINGER OF RIGHT HAND: ICD-10-CM

## 2023-11-18 RX ORDER — CEPHALEXIN 500 MG/1
500 CAPSULE ORAL 3 TIMES DAILY
Qty: 21 CAPSULE | Refills: 0 | Status: SHIPPED | OUTPATIENT
Start: 2023-11-18 | End: 2023-11-25

## 2023-11-18 NOTE — DISCHARGE INSTRUCTIONS
Follow-up with your primary care physician in one week if symptoms have not improved or symptoms are starting to get worse. Increase fluids, keep well-hydrated. Take Tylenol and Motrin for fever and pain.   Finish the full course of antibiotics for 7 days  Keep the area clean dry  Return to emergency room for symptoms or concerns Moderna dose 1 and 2

## 2023-11-22 ENCOUNTER — OFFICE VISIT (OUTPATIENT)
Dept: NEUROLOGY | Facility: CLINIC | Age: 38
End: 2023-11-22
Payer: COMMERCIAL

## 2023-11-22 ENCOUNTER — TELEPHONE (OUTPATIENT)
Dept: NEUROLOGY | Facility: CLINIC | Age: 38
End: 2023-11-22

## 2023-11-22 VITALS
DIASTOLIC BLOOD PRESSURE: 73 MMHG | RESPIRATION RATE: 16 BRPM | WEIGHT: 242 LBS | SYSTOLIC BLOOD PRESSURE: 126 MMHG | BODY MASS INDEX: 37 KG/M2 | HEART RATE: 83 BPM

## 2023-11-22 DIAGNOSIS — G43.709 CHRONIC MIGRAINE W/O AURA, NOT INTRACTABLE, W/O STAT MIGR: Primary | ICD-10-CM

## 2023-11-22 DIAGNOSIS — G43.909 MIXED MIGRAINE AND MUSCLE CONTRACTION HEADACHE: ICD-10-CM

## 2023-11-22 DIAGNOSIS — G44.209 MIXED MIGRAINE AND MUSCLE CONTRACTION HEADACHE: ICD-10-CM

## 2023-11-22 PROCEDURE — 3074F SYST BP LT 130 MM HG: CPT | Performed by: OTHER

## 2023-11-22 PROCEDURE — 3078F DIAST BP <80 MM HG: CPT | Performed by: OTHER

## 2023-11-22 PROCEDURE — 99213 OFFICE O/P EST LOW 20 MIN: CPT | Performed by: OTHER

## 2023-11-22 RX ORDER — TIRZEPATIDE 5 MG/.5ML
5 INJECTION, SOLUTION SUBCUTANEOUS
COMMUNITY
Start: 2023-11-03 | End: 2023-11-22 | Stop reason: ALTCHOICE

## 2023-11-22 RX ORDER — UBROGEPANT 100 MG/1
TABLET ORAL
Qty: 10 TABLET | Refills: 5 | Status: SHIPPED | OUTPATIENT
Start: 2023-11-22

## 2023-11-22 RX ORDER — TOPIRAMATE 25 MG/1
TABLET ORAL
COMMUNITY
Start: 2023-09-22 | End: 2023-11-22 | Stop reason: ALTCHOICE

## 2023-11-22 RX ORDER — ERENUMAB-AOOE 70 MG/ML
70 INJECTION SUBCUTANEOUS
Qty: 1 ML | Refills: 5 | Status: SHIPPED | OUTPATIENT
Start: 2023-11-22

## 2023-11-22 RX ORDER — IBUPROFEN 800 MG/1
800 TABLET ORAL EVERY 6 HOURS PRN
COMMUNITY
Start: 2023-10-23

## 2023-11-22 RX ORDER — HYDROCODONE BITARTRATE AND ACETAMINOPHEN 7.5; 325 MG/1; MG/1
1 TABLET ORAL
COMMUNITY
Start: 2023-10-23 | End: 2023-11-22 | Stop reason: ALTCHOICE
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
## 2023-11-22 NOTE — PROGRESS NOTES
Pt reports AIMOVIG is controlling migraines very well. 1-2 migraines per month, UBRELVY does abort migraines that do break through.

## 2023-11-22 NOTE — TELEPHONE ENCOUNTER
Rec'd incoming fax from pharmacy requesting PA be completed for Ubrelvy 100mg tab. Per chart review, medication approval already in place from 6/21/23 through 7/20/24. Printed approval information from patient's chart and faxed to pharmacy with confirmation received.

## 2023-11-24 ENCOUNTER — TELEPHONE (OUTPATIENT)
Dept: NEUROLOGY | Facility: CLINIC | Age: 38
End: 2023-11-24

## 2023-11-24 NOTE — TELEPHONE ENCOUNTER
Received request from [employer] for PA on Ubrelvy. Previous approved by [facility]. Request started through epic.

## 2023-11-27 NOTE — TELEPHONE ENCOUNTER
Received denial stating patient has never tried a triptan. No questions were asked regarding triptan. New PA started.

## 2023-12-12 NOTE — TELEPHONE ENCOUNTER
Patient was denied d/t no triptans however the question never asked about triptans. Patient has been on many abortive medications. Resubmitted but questions never submitted. Submitted today and pending.  Will check tomorrow am for approval or denial.

## 2023-12-13 NOTE — TELEPHONE ENCOUNTER
Received fax from [employer].   Approval from 12/13/2023-12/13/2024  Medication Ubrelvy 100 mg 16/30 days  Case number [id number]  Faxed approval to dispensing pharmacy  Received fax confirmation

## 2023-12-13 NOTE — TELEPHONE ENCOUNTER
Pending through epic. Submitted through cover my meds and received question for triptans.    Key: [id number]

## 2023-12-14 NOTE — TELEPHONE ENCOUNTER
Pt called and was informed [facility] was approved(per nurse Anjel Vogel) . Pt upset that script was sent to [facility], pt states does not use [facility] any longer. Per nurse Ciara Maciel pt to call [facility] in [facility] to request script be transferred to them. Pt will c/b if any issues.

## 2023-12-21 DIAGNOSIS — G44.209 MIXED MIGRAINE AND MUSCLE CONTRACTION HEADACHE: ICD-10-CM

## 2023-12-21 DIAGNOSIS — G43.709 CHRONIC MIGRAINE W/O AURA, NOT INTRACTABLE, W/O STAT MIGR: ICD-10-CM

## 2023-12-21 DIAGNOSIS — G43.909 MIXED MIGRAINE AND MUSCLE CONTRACTION HEADACHE: ICD-10-CM

## 2023-12-21 NOTE — TELEPHONE ENCOUNTER
Per Epic review, Rx Aimovig 70mg with 5 refills was sent to [facility] on 11/22/2023.   MLTCB on  (ok per HIPAA consent) to verify if patient requested change of pharmacy?

## 2024-01-04 RX ORDER — ERENUMAB-AOOE 70 MG/ML
INJECTION SUBCUTANEOUS
Qty: 1 ML | Refills: 0 | OUTPATIENT
Start: 2024-01-04

## 2024-01-04 NOTE — TELEPHONE ENCOUNTER
Patient spoke with PSR Julienne yesterday and was reportedly frustrated that our office had sent prescriptions to her old pharmacy rather than her new one.  PSR asking if [facility] could contact patient to explain.    LM for patient explaining that Rxs for a 6 mo supply of both  Aimovig and Ubrelvy were sent by Dr. Alvarado to the [facility] pharmacy at the last OV on 11/22/23.  Further explained that we received an electronic refill request from [facility] in [place] on 12/21/23.  Per our normal workflow, we left her a message on 12/21 at 12:02pm asking her to confirm that she was requesting this Rx transfer, indicating that we always confirm with patient before changing a pharmacy when the request comes electronically - to avoid any potential errors.  Also indicated that this request was ONLY for Aimovig, not Ubrelvy.  Asked that she c/b office or send a [employer] message to confirm the [facility] in [place] is now her preferred pharmacy, and whether she wants one or both meds switched over.  Awaiting further instruction from patient.    After further chart review, viewed note in alternate TE dated 12/14/23 re: Ubrelvy:    KeilaJulienne ISRA    12/14/23  4:13 PM  Note  Pt called and was informed Ubrelvy was approved(per nurse Uribe) . Pt upset that script was sent to [facility], pt states does not use [facility] any longer. Per nurse Hammer pt to call [facility] in [place] to request script be transferred to them. Pt will c/b if any issues.         Contacted [facility] pharmacy to determine if these two prescriptions had been transferred.    Aimovig was closed and transferred to [facility].  Ubrelvy was stored and never filled.    Contacted [facility].  Spoke with pharmacist and confirmed they transferred the Aimovig Rx to them yesterday.  They have not transferred the Ubrelvy Rx from November, but still have one refill left on one transferred from July.  Gave VORB for new Ubrelvy Rx to pharmacist.    Refill request from this encounter was refused.   Patient's pharmacy list updated, marking [facility] as preferred and removing the [facility].  Contacted [facility] and left message on confidential voice mail to remove the Ubrelvy from their system.    Left message for patient relaying above information and encouraging patient to call with any further questions.

## 2024-01-20 ENCOUNTER — APPOINTMENT (OUTPATIENT)
Dept: GENERAL RADIOLOGY | Age: 39
End: 2024-01-20
Attending: NURSE PRACTITIONER
Payer: COMMERCIAL

## 2024-01-20 ENCOUNTER — HOSPITAL ENCOUNTER (OUTPATIENT)
Age: 39
Discharge: HOME | End: 2024-01-20
Payer: COMMERCIAL

## 2024-01-20 VITALS
RESPIRATION RATE: 18 BRPM | SYSTOLIC BLOOD PRESSURE: 148 MMHG | DIASTOLIC BLOOD PRESSURE: 95 MMHG | WEIGHT: 230 LBS | HEIGHT: 68 IN | HEART RATE: 82 BPM | TEMPERATURE: 98 F | BODY MASS INDEX: 34.86 KG/M2 | OXYGEN SATURATION: 100 %

## 2024-01-20 DIAGNOSIS — S99.912A INJURY OF LEFT ANKLE, INITIAL ENCOUNTER: Primary | ICD-10-CM

## 2024-01-20 DIAGNOSIS — M79.672 LEFT FOOT PAIN: ICD-10-CM

## 2024-01-20 PROCEDURE — 73630 X-RAY EXAM OF FOOT: CPT | Performed by: NURSE PRACTITIONER

## 2024-01-20 PROCEDURE — 73610 X-RAY EXAM OF ANKLE: CPT | Performed by: NURSE PRACTITIONER

## 2024-01-20 PROCEDURE — L4350 ANKLE CONTROL ORTHO PRE OTS: HCPCS | Performed by: NURSE PRACTITIONER

## 2024-01-20 PROCEDURE — 99213 OFFICE O/P EST LOW 20 MIN: CPT | Performed by: NURSE PRACTITIONER
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
## 2024-01-20 NOTE — DISCHARGE INSTRUCTIONS
Use the Velcro ankle stirrup provided for the next week.  Over-the-counter Motrin and/or Tylenol as needed for pain.  If no improvement after 1 week, follow-up with the orthopedist.

## 2024-02-02 ENCOUNTER — APPOINTMENT (OUTPATIENT)
Dept: GENERAL RADIOLOGY | Age: 39
End: 2024-02-02
Attending: NURSE PRACTITIONER
Payer: COMMERCIAL

## 2024-02-02 ENCOUNTER — HOSPITAL ENCOUNTER (OUTPATIENT)
Age: 39
Discharge: HOME | End: 2024-02-02
Payer: COMMERCIAL

## 2024-02-02 VITALS
SYSTOLIC BLOOD PRESSURE: 160 MMHG | WEIGHT: 230 LBS | RESPIRATION RATE: 20 BRPM | TEMPERATURE: 98 F | HEART RATE: 114 BPM | DIASTOLIC BLOOD PRESSURE: 99 MMHG | BODY MASS INDEX: 35 KG/M2 | OXYGEN SATURATION: 98 %

## 2024-02-02 DIAGNOSIS — M79.642 PAIN OF LEFT HAND: Primary | ICD-10-CM

## 2024-02-02 DIAGNOSIS — S60.222A CONTUSION OF LEFT HAND, INITIAL ENCOUNTER: ICD-10-CM

## 2024-02-02 PROCEDURE — 73130 X-RAY EXAM OF HAND: CPT | Performed by: NURSE PRACTITIONER

## 2024-02-02 PROCEDURE — 99213 OFFICE O/P EST LOW 20 MIN: CPT | Performed by: NURSE PRACTITIONER

## 2024-02-02 PROCEDURE — A6449 LT COMPRES BAND >=3" <5"/YD: HCPCS | Performed by: NURSE PRACTITIONER

## 2024-02-02 NOTE — ED INITIAL ASSESSMENT (HPI)
Pt sts this morning punched a wall with left hand. Pain, swelling, and bruising to 3rd and 4th metacarpal and into 3rd and 4th finger.
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
## 2024-02-08 ENCOUNTER — HOSPITAL ENCOUNTER (EMERGENCY)
Age: 39
Discharge: HOME | End: 2024-02-09
Attending: EMERGENCY MEDICINE
Payer: COMMERCIAL
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
## 2024-02-08 DIAGNOSIS — S51.851A DOG BITE OF RIGHT FOREARM, INITIAL ENCOUNTER: Primary | ICD-10-CM

## 2024-02-08 PROCEDURE — 99283 EMERGENCY DEPT VISIT LOW MDM: CPT

## 2024-02-09 ENCOUNTER — APPOINTMENT (OUTPATIENT)
Dept: GENERAL RADIOLOGY | Age: 39
End: 2024-02-09
Attending: NURSE PRACTITIONER
Payer: COMMERCIAL

## 2024-02-09 ENCOUNTER — HOSPITAL ENCOUNTER (OUTPATIENT)
Age: 39
Discharge: HOME | End: 2024-02-09
Payer: COMMERCIAL

## 2024-02-09 VITALS
DIASTOLIC BLOOD PRESSURE: 87 MMHG | HEART RATE: 96 BPM | SYSTOLIC BLOOD PRESSURE: 135 MMHG | WEIGHT: 230 LBS | RESPIRATION RATE: 16 BRPM | HEIGHT: 68 IN | TEMPERATURE: 98 F | OXYGEN SATURATION: 100 % | BODY MASS INDEX: 34.86 KG/M2

## 2024-02-09 VITALS
TEMPERATURE: 99 F | RESPIRATION RATE: 16 BRPM | HEART RATE: 84 BPM | BODY MASS INDEX: 34.86 KG/M2 | WEIGHT: 230 LBS | SYSTOLIC BLOOD PRESSURE: 138 MMHG | HEIGHT: 68 IN | DIASTOLIC BLOOD PRESSURE: 88 MMHG | OXYGEN SATURATION: 99 %

## 2024-02-09 DIAGNOSIS — G43.709 CHRONIC MIGRAINE W/O AURA, NOT INTRACTABLE, W/O STAT MIGR: ICD-10-CM
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
## 2024-02-09 DIAGNOSIS — S61.551A DOG BITE OF RIGHT WRIST, INITIAL ENCOUNTER: Primary | ICD-10-CM

## 2024-02-09 PROCEDURE — 73110 X-RAY EXAM OF WRIST: CPT | Performed by: NURSE PRACTITIONER

## 2024-02-09 PROCEDURE — 99213 OFFICE O/P EST LOW 20 MIN: CPT | Performed by: NURSE PRACTITIONER

## 2024-02-09 RX ORDER — ACETAMINOPHEN 500 MG
1000 TABLET ORAL ONCE
Status: COMPLETED | OUTPATIENT
Start: 2024-02-09 | End: 2024-02-09

## 2024-02-09 RX ORDER — SACCHAROMYCES BOULARDII 250 MG
250 CAPSULE ORAL 2 TIMES DAILY
Qty: 60 CAPSULE | Refills: 0 | Status: SHIPPED | OUTPATIENT
Start: 2024-02-09 | End: 2024-03-10

## 2024-02-09 RX ORDER — AMOXICILLIN AND CLAVULANATE POTASSIUM 875; 125 MG/1; MG/1
1 TABLET, FILM COATED ORAL ONCE
Status: COMPLETED | OUTPATIENT
Start: 2024-02-09 | End: 2024-02-09

## 2024-02-09 RX ORDER — UBROGEPANT 100 MG/1
TABLET ORAL
Qty: 10 TABLET | Refills: 2 | Status: SHIPPED | OUTPATIENT
Start: 2024-02-09

## 2024-02-09 RX ORDER — AMOXICILLIN AND CLAVULANATE POTASSIUM 875; 125 MG/1; MG/1
1 TABLET, FILM COATED ORAL 2 TIMES DAILY
Qty: 20 TABLET | Refills: 0 | Status: SHIPPED | OUTPATIENT
Start: 2024-02-09 | End: 2024-02-19

## 2024-02-09 RX ORDER — IBUPROFEN 600 MG/1
600 TABLET ORAL ONCE
Status: COMPLETED | OUTPATIENT
Start: 2024-02-09 | End: 2024-02-09

## 2024-02-09 RX ORDER — ACETAMINOPHEN AND CODEINE PHOSPHATE 300; 30 MG/1; MG/1
1-2 TABLET ORAL EVERY 6 HOURS PRN
Qty: 10 TABLET | Refills: 0 | Status: SHIPPED | OUTPATIENT
Start: 2024-02-09 | End: 2024-02-14
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
## 2024-02-09 NOTE — DISCHARGE INSTRUCTIONS
May clean the wound area with mild soap and water, gently pat to dry.  Use an over-the-counter topical antibiotic ointment like Neosporin, triple antibiotic, over the wound area to prevent infection.  Cover with a bulky dry dressing such as Kerlix roll.  Continue taking the antibiotics as you are prescribed.  Take an over-the-counter probiotic daily while on the antibiotics.  Follow-up with your doctor in 3 days for wound check.

## 2024-02-09 NOTE — ED INITIAL ASSESSMENT (HPI)
2/9 AM Pt + bleeding from bite that \"soaked through multiple bandages\".  Splint and bandage from home removed, bleeding controlled at this time.

## 2024-02-09 NOTE — DISCHARGE INSTRUCTIONS
Please take Tylenol alternate with Motrin for pain control.  Please take pictures of the wound daily to monitor for signs for progression.  If you start noticing redness of the forearm or up the arm return to the ER immediately for further evaluation.  Or if you develop fever or worsening pain return to the ER immediately.  You are also being prescribed Tylenol 3 for severe pain do not take Tylenol 3 and drive or operate heavy machinery as it will make you drowsy.  You were given your first dose of Augmentin here in the ER take the next dose in 12 hours.  Finish the entire course.  Take Florastor as prescribed as a probiotic.

## 2024-02-09 NOTE — ED INITIAL ASSESSMENT (HPI)
Pt to ED after sustaining a dog bite to inner right wrist by known dog, whose up to date on vaccines. Small lac to inner right wrist. Pt's tetanus UTD

## 2024-02-09 NOTE — TELEPHONE ENCOUNTER
Spoke with Susan from the pharmacy who states that the prescription did not have refills when transferred. Patient will need a prescription per the pharmacy.         Medication: UBRELVY 100 MG Oral Tab      Date of last refill: 11/22/2023 (#10/5)  [place]     Last office visit: 11/22/2023  Due back to clinic per last office note:  Around 11/22/2024  Date next office visit scheduled:    No future appointments.        Last OV note recommendation:    Assessment  ASSESSMENT/PLAN:           Encounter Diagnosis   Name Primary?    Chronic migraine w/o aura, not intractable, w/o stat migr Yes         Patient is a 38 year old female who presents for migraines follow up     Stable on current regime. Continue Aimovig 70 mg nightly     Counseled on cannabis dependence and limit use, consider rehabilitation program     See orders and medications filed with this encounter. The patient indicates understanding of these issues and agrees with the plan.        No orders of the defined types were placed in this encounter.
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
## 2024-03-12 ENCOUNTER — APPOINTMENT (OUTPATIENT)
Dept: GENERAL RADIOLOGY | Age: 39
End: 2024-03-12
Attending: NURSE PRACTITIONER
Payer: COMMERCIAL

## 2024-03-12 ENCOUNTER — HOSPITAL ENCOUNTER (OUTPATIENT)
Age: 39
Discharge: HOME | End: 2024-03-12
Payer: COMMERCIAL

## 2024-03-12 VITALS
HEIGHT: 68 IN | WEIGHT: 230 LBS | HEART RATE: 110 BPM | DIASTOLIC BLOOD PRESSURE: 84 MMHG | OXYGEN SATURATION: 98 % | SYSTOLIC BLOOD PRESSURE: 124 MMHG | TEMPERATURE: 97 F | BODY MASS INDEX: 34.86 KG/M2 | RESPIRATION RATE: 26 BRPM

## 2024-03-12 DIAGNOSIS — R06.00 DYSPNEA, UNSPECIFIED TYPE: ICD-10-CM

## 2024-03-12 DIAGNOSIS — R00.0 TACHYCARDIA: ICD-10-CM

## 2024-03-12 DIAGNOSIS — J45.21 MILD INTERMITTENT ASTHMA WITH EXACERBATION (HCC): Primary | ICD-10-CM

## 2024-03-12 LAB
#MXD IC: 0.4 X10ˆ3/UL (ref 0.1–1)
BUN BLD-MCNC: 5 MG/DL (ref 7–18)
CHLORIDE BLD-SCNC: 101 MMOL/L (ref 98–112)
CO2 BLD-SCNC: 25 MMOL/L (ref 21–32)
CREAT BLD-MCNC: 0.6 MG/DL
DDIMER WHOLE BLOOD: 214 NG/ML DDU (ref ?–400)
EGFRCR SERPLBLD CKD-EPI 2021: 118 ML/MIN/1.73M2 (ref 60–?)
GLUCOSE BLD-MCNC: 137 MG/DL (ref 70–99)
HCT VFR BLD AUTO: 38.3 %
HCT VFR BLD CALC: 37 %
HGB BLD-MCNC: 13 G/DL
ISTAT IONIZED CALCIUM FOR CHEM 8: 1.21 MMOL/L (ref 1.12–1.32)
LYMPHOCYTES # BLD AUTO: 0.2 X10ˆ3/UL (ref 1–4)
LYMPHOCYTES NFR BLD AUTO: 3.4 %
MCH RBC QN AUTO: 30.7 PG (ref 26–34)
MCHC RBC AUTO-ENTMCNC: 33.9 G/DL (ref 31–37)
MCV RBC AUTO: 90.5 FL (ref 80–100)
MIXED CELL %: 7.1 %
NEUTROPHILS # BLD AUTO: 4.6 X10ˆ3/UL (ref 1.5–7.7)
NEUTROPHILS NFR BLD AUTO: 89.5 %
PLATELET # BLD AUTO: 264 X10ˆ3/UL (ref 150–450)
POTASSIUM BLD-SCNC: 3.8 MMOL/L (ref 3.6–5.1)
RBC # BLD AUTO: 4.23 X10ˆ6/UL
SARS-COV-2 RNA RESP QL NAA+PROBE: NOT DETECTED
SODIUM BLD-SCNC: 138 MMOL/L (ref 136–145)
WBC # BLD AUTO: 5.2 X10ˆ3/UL (ref 4–11)

## 2024-03-12 PROCEDURE — 80047 BASIC METABLC PNL IONIZED CA: CPT | Performed by: NURSE PRACTITIONER

## 2024-03-12 PROCEDURE — U0002 COVID-19 LAB TEST NON-CDC: HCPCS | Performed by: NURSE PRACTITIONER

## 2024-03-12 PROCEDURE — 93000 ELECTROCARDIOGRAM COMPLETE: CPT | Performed by: NURSE PRACTITIONER

## 2024-03-12 PROCEDURE — 85378 FIBRIN DEGRADE SEMIQUANT: CPT | Performed by: NURSE PRACTITIONER

## 2024-03-12 PROCEDURE — 99214 OFFICE O/P EST MOD 30 MIN: CPT | Performed by: NURSE PRACTITIONER

## 2024-03-12 PROCEDURE — 71046 X-RAY EXAM CHEST 2 VIEWS: CPT | Performed by: NURSE PRACTITIONER

## 2024-03-12 PROCEDURE — 85025 COMPLETE CBC W/AUTO DIFF WBC: CPT | Performed by: NURSE PRACTITIONER

## 2024-03-12 RX ORDER — PREDNISONE 20 MG/1
20 TABLET ORAL DAILY
Qty: 5 TABLET | Refills: 0 | Status: SHIPPED | OUTPATIENT
Start: 2024-03-12 | End: 2024-03-12

## 2024-03-12 RX ORDER — BENZONATATE 100 MG/1
100 CAPSULE ORAL 3 TIMES DAILY PRN
Qty: 30 CAPSULE | Refills: 0 | Status: SHIPPED | OUTPATIENT
Start: 2024-03-12 | End: 2024-03-12

## 2024-03-12 RX ORDER — PREDNISONE 20 MG/1
20 TABLET ORAL DAILY
Qty: 5 TABLET | Refills: 0 | Status: SHIPPED | OUTPATIENT
Start: 2024-03-12 | End: 2024-03-17

## 2024-03-12 RX ORDER — BENZONATATE 100 MG/1
100 CAPSULE ORAL 3 TIMES DAILY PRN
Qty: 30 CAPSULE | Refills: 0 | Status: SHIPPED | OUTPATIENT
Start: 2024-03-12 | End: 2024-04-11

## 2024-03-12 RX ORDER — PREDNISONE 20 MG/1
20 TABLET ORAL ONCE
Status: COMPLETED | OUTPATIENT
Start: 2024-03-12 | End: 2024-03-12
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
## 2024-03-13 LAB
ATRIAL RATE: 111 BPM
P AXIS: 49 DEGREES
P-R INTERVAL: 166 MS
Q-T INTERVAL: 320 MS
QRS DURATION: 84 MS
QTC CALCULATION (BEZET): 435 MS
R AXIS: 67 DEGREES
T AXIS: 31 DEGREES
VENTRICULAR RATE: 111 BPM

## 2024-04-17 NOTE — TELEPHONE ENCOUNTER
Marli Whitt is helping the migraines, but they are more frequently so she is having to take the Ubelvry more frequently. She has no more refills. Should you prescribe more? Please call pt to advise. Detail Level: Generalized

## 2024-05-06 ENCOUNTER — APPOINTMENT (OUTPATIENT)
Dept: GENERAL RADIOLOGY | Age: 39
End: 2024-05-06
Attending: NURSE PRACTITIONER
Payer: COMMERCIAL

## 2024-05-06 ENCOUNTER — HOSPITAL ENCOUNTER (OUTPATIENT)
Age: 39
Discharge: HOME | End: 2024-05-06
Payer: COMMERCIAL

## 2024-05-06 VITALS
HEIGHT: 68 IN | RESPIRATION RATE: 18 BRPM | DIASTOLIC BLOOD PRESSURE: 76 MMHG | BODY MASS INDEX: 35.61 KG/M2 | SYSTOLIC BLOOD PRESSURE: 118 MMHG | HEART RATE: 93 BPM | TEMPERATURE: 98 F | WEIGHT: 235 LBS | OXYGEN SATURATION: 99 %

## 2024-05-06 DIAGNOSIS — M79.672 LEFT FOOT PAIN: Primary | ICD-10-CM

## 2024-05-06 PROCEDURE — 73630 X-RAY EXAM OF FOOT: CPT | Performed by: NURSE PRACTITIONER

## 2024-05-06 PROCEDURE — 99213 OFFICE O/P EST LOW 20 MIN: CPT | Performed by: NURSE PRACTITIONER

## 2024-05-06 PROCEDURE — L3260 AMBULATORY SURGICAL BOOT EAC: HCPCS | Performed by: NURSE PRACTITIONER

## 2024-05-06 NOTE — DISCHARGE INSTRUCTIONS
RICE:     Rest and elevate the affected extremity. Apply ice 4 times daily with a cloth barrier to reduce swelling.  You may wear an Ace bandage for comfort and support and to help reduce swelling.  You may take ibuprofen 600mg every 6 hours as needed for pain.  You may also take Tylenol 1000mg every 6 hours as needed for pain.  Follow-up with your primary care physician in 2-3 days as needed.  Return to the emergency room if you develop new or worsening symptoms.
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
## 2024-05-18 ENCOUNTER — PATIENT MESSAGE (OUTPATIENT)
Dept: NEUROLOGY | Facility: CLINIC | Age: 39
End: 2024-05-18

## 2024-05-18 DIAGNOSIS — G43.709 CHRONIC MIGRAINE W/O AURA, NOT INTRACTABLE, W/O STAT MIGR: Primary | ICD-10-CM

## 2024-05-20 RX ORDER — ERENUMAB-AOOE 140 MG/ML
140 INJECTION, SOLUTION SUBCUTANEOUS
Qty: 1 ML | Refills: 3 | Status: SHIPPED | OUTPATIENT
Start: 2024-05-20 | End: 2025-05-20

## 2024-06-04 ENCOUNTER — HOSPITAL ENCOUNTER (OUTPATIENT)
Age: 39
Discharge: HOME | End: 2024-06-04
Payer: COMMERCIAL

## 2024-06-04 VITALS
HEART RATE: 101 BPM | WEIGHT: 240 LBS | HEIGHT: 68 IN | DIASTOLIC BLOOD PRESSURE: 77 MMHG | TEMPERATURE: 97 F | SYSTOLIC BLOOD PRESSURE: 125 MMHG | RESPIRATION RATE: 20 BRPM | OXYGEN SATURATION: 98 % | BODY MASS INDEX: 36.37 KG/M2

## 2024-06-04 DIAGNOSIS — B37.0 THRUSH, ORAL: Primary | ICD-10-CM

## 2024-06-04 PROCEDURE — 99213 OFFICE O/P EST LOW 20 MIN: CPT | Performed by: PHYSICIAN ASSISTANT

## 2024-06-04 RX ORDER — TIRZEPATIDE 5 MG/.5ML
5 INJECTION, SOLUTION SUBCUTANEOUS
COMMUNITY
Start: 2024-05-28
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
## 2024-06-04 NOTE — ED INITIAL ASSESSMENT (HPI)
Pt with whitish yellow coating on tongue and weird taste in mouth. Pt takes breo ellipta inhaler and rinses after to prevent thrush.

## 2024-06-06 ENCOUNTER — OFFICE VISIT (OUTPATIENT)
Dept: NEUROLOGY | Facility: CLINIC | Age: 39
End: 2024-06-06
Payer: COMMERCIAL

## 2024-06-06 VITALS
DIASTOLIC BLOOD PRESSURE: 72 MMHG | RESPIRATION RATE: 16 BRPM | HEART RATE: 80 BPM | WEIGHT: 241.19 LBS | SYSTOLIC BLOOD PRESSURE: 106 MMHG | BODY MASS INDEX: 37 KG/M2

## 2024-06-06 DIAGNOSIS — G43.709 CHRONIC MIGRAINE W/O AURA, NOT INTRACTABLE, W/O STAT MIGR: Primary | ICD-10-CM

## 2024-06-06 DIAGNOSIS — H53.9 VISUAL AURA: ICD-10-CM

## 2024-06-06 DIAGNOSIS — R79.89 ELEVATED PROLACTIN LEVEL: ICD-10-CM

## 2024-06-06 PROCEDURE — 99214 OFFICE O/P EST MOD 30 MIN: CPT | Performed by: OTHER

## 2024-06-06 PROCEDURE — 3078F DIAST BP <80 MM HG: CPT | Performed by: OTHER

## 2024-06-06 PROCEDURE — 3074F SYST BP LT 130 MM HG: CPT | Performed by: OTHER

## 2024-06-06 RX ORDER — UBROGEPANT 100 MG/1
TABLET ORAL
Qty: 10 TABLET | Refills: 2 | Status: SHIPPED | OUTPATIENT
Start: 2024-06-06

## 2024-06-06 NOTE — PATIENT INSTRUCTIONS
After your visit at the [place] office  today, please direct any follow up questions or medication needs to the staff in our [place] office so that your concerns may be promptly addressed.  We are available through [employer] or at the numbers below:    The phone number is:   [phone], option 1    The fax number is:  [phone]    Your pharmacy should also send any requests electronically to the [place] office.       Refill policies:    Allow 2-3 business days for refills; controlled substances may take longer.  Contact your pharmacy at least 5 days prior to running out of medication and have them send an electronic request or submit request through the “request refill” option in your [employer] account.  Refills are not addressed on weekends; covering physicians do not authorize routine medications on weekends.  No narcotics or controlled substances are refilled after noon on Fridays or by on call physicians.  By law, narcotics must be electronically prescribed.  A 30 day supply with no refills is the maximum allowed.  If your prescription is due for a refill, you may be due for a follow up appointment.  To best provide you care, patients receiving routine medications need to be seen at least once a year.  Patients receiving narcotic/controlled substance medications need to be seen at least once every 3 months.  In the event that your preferred pharmacy does not have the requested medication in stock (e.g. Backordered), it is your responsibility to find another pharmacy that has the requested medication available.  We will gladly send a new prescription to that pharmacy at your request.    Scheduling Tests:    If your physician has ordered radiology tests such as MRI or CT scans, please contact Central Scheduling at [phone] right away to schedule the test.  Once scheduled, the [facility] Centralized Referral Team will work with your insurance carrier to obtain pre-certification or prior authorization.   Depending on your insurance carrier, approval may take 3-10 days.  It is highly recommended patients assure they have received an authorization before having a test performed.  If test is done without insurance authorization, patient may be responsible for the entire amount billed.      Precertification and Prior Authorizations:  If your physician has recommended that you have a procedure or additional testing performed the [facility] Centralized Referral Team will contact your insurance carrier to obtain pre-certification or prior authorization.    You are strongly encouraged to contact your insurance carrier to verify that your procedure/test has been approved and is a COVERED benefit.  Although the [facility] Centralized Referral Team does its due diligence, the insurance carrier gives the disclaimer that \"Although the procedure is authorized, this does not guarantee payment.\"    Ultimately the patient is responsible for payment.   Thank you for your understanding in this matter.  Paperwork Completion:  If you require FMLA or disability paperwork for your recovery, please make sure to either drop it off or have it faxed to our office at [phone]. Be sure the form has your name and date of birth on it.  The form will be faxed to our Forms Department and they will complete it for you.  There is a 25$ fee for all forms that need to be filled out.  Please be aware there is a 10-14 day turnaround time.  You will need to sign a release of information (CARLOS) form if your paperwork does not come with one.  You may call the Forms Department with any questions at [phone].  Their fax number is [phone].
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
## 2024-06-23 DIAGNOSIS — G43.909 MIXED MIGRAINE AND MUSCLE CONTRACTION HEADACHE: ICD-10-CM

## 2024-06-23 DIAGNOSIS — G43.709 CHRONIC MIGRAINE W/O AURA, NOT INTRACTABLE, W/O STAT MIGR: ICD-10-CM

## 2024-06-23 DIAGNOSIS — G44.209 MIXED MIGRAINE AND MUSCLE CONTRACTION HEADACHE: ICD-10-CM

## 2024-06-24 RX ORDER — ERENUMAB-AOOE 70 MG/ML
70 INJECTION SUBCUTANEOUS
Qty: 1 ML | Refills: 0 | OUTPATIENT
Start: 2024-06-24

## 2024-06-24 NOTE — TELEPHONE ENCOUNTER
Medication: Aimovig 70 mg     Date of last refill: 11/22/2023 (#1/5)  [place]     Last office visit: 06/06/2024  Due back to clinic per last office note:  3 months  Date next office visit scheduled:    Future Appointments   Date Time Provider Department Center   6/25/2024  3:45 PM PF MRI RM1 (1.5T) PF MRI [place]           Last OV note recommendation:    Migraine with interval worsening, had 2 episodes of visual aura  Continue Aimovig 140 mg nightly      Add magnesium supplement 200-400 mg     Obtain MRI of the brain with and without contrast     2.  Elevated prolactin levels, likely could be related to the chronic use of the antipsychotics patient has been on Rexulti for many years and there are reports that it can elevate prolactin  Patient was referred to endocrinology.   Will add MRI pituitary to rule out any pituitary adenoma  Was advised to discuss with the psychiatrist regarding Rexulti and elevated prolactin levels     3. Follow up after the MRI is completed

## 2024-07-04 ENCOUNTER — HOSPITAL ENCOUNTER (OUTPATIENT)
Age: 39
Discharge: HOME | End: 2024-07-04
Payer: COMMERCIAL

## 2024-07-04 ENCOUNTER — APPOINTMENT (OUTPATIENT)
Dept: GENERAL RADIOLOGY | Age: 39
End: 2024-07-04
Attending: PHYSICIAN ASSISTANT
Payer: COMMERCIAL

## 2024-07-04 VITALS
HEART RATE: 79 BPM | OXYGEN SATURATION: 98 % | TEMPERATURE: 97 F | SYSTOLIC BLOOD PRESSURE: 124 MMHG | RESPIRATION RATE: 18 BRPM | DIASTOLIC BLOOD PRESSURE: 91 MMHG

## 2024-07-04 DIAGNOSIS — S46.001A INJURY OF RIGHT ROTATOR CUFF, INITIAL ENCOUNTER: ICD-10-CM

## 2024-07-04 DIAGNOSIS — S49.91XA INJURY OF RIGHT SHOULDER, INITIAL ENCOUNTER: Primary | ICD-10-CM

## 2024-07-04 PROCEDURE — 73030 X-RAY EXAM OF SHOULDER: CPT | Performed by: PHYSICIAN ASSISTANT

## 2024-07-04 PROCEDURE — 99214 OFFICE O/P EST MOD 30 MIN: CPT | Performed by: PHYSICIAN ASSISTANT

## 2024-07-04 RX ORDER — NAPROXEN 500 MG/1
500 TABLET ORAL 2 TIMES DAILY PRN
Qty: 14 TABLET | Refills: 0 | Status: SHIPPED | OUTPATIENT
Start: 2024-07-04 | End: 2024-07-04 | Stop reason: SINTOL

## 2024-07-04 RX ORDER — CYCLOBENZAPRINE HCL 10 MG
10 TABLET ORAL 3 TIMES DAILY PRN
Qty: 20 TABLET | Refills: 0 | Status: SHIPPED | OUTPATIENT
Start: 2024-07-04 | End: 2024-07-11

## 2024-07-04 RX ORDER — CYCLOBENZAPRINE HCL 10 MG
10 TABLET ORAL 3 TIMES DAILY PRN
Qty: 20 TABLET | Refills: 0 | Status: SHIPPED | OUTPATIENT
Start: 2024-07-04 | End: 2024-07-04

## 2024-07-04 NOTE — ED PROVIDER NOTES
Patient Seen in: [facility] [place]      History     Chief Complaint   Patient presents with    Arm or Hand Injury     Stated Complaint: shoulder pain    Subjective:   The history is provided by the patient.       39-year-old female presents to the immediate care due to right shoulder injury which occurred just prior to arrival.  Patient was walking her dogs when they attempted to run away hyperextending the right shoulder.  Since incident complains of significant pain in the right shoulder, limited range of motion due to pain.  No peripheral tingling or numbness.  No previous injuries to the shoulder in the past.  Right-hand-dominant.    Objective:   No pertinent past medical history.            No pertinent past surgical history.              No pertinent social history.            Review of Systems   Respiratory: Negative.     Cardiovascular: Negative.    Gastrointestinal: Negative.    Musculoskeletal:  Positive for joint swelling.   Skin: Negative.        Positive for stated Chief Complaint: Arm or Hand Injury    Other systems are as noted in HPI.  Constitutional and vital signs reviewed.      All other systems reviewed and negative except as noted above.    Physical Exam     ED Triage Vitals [07/04/24 0809]   BP (!) 124/91   Pulse 79   Resp 18   Temp 97.4 °F (36.3 °C)   Temp src Temporal   SpO2 98 %   O2 Device None (Room air)       Current Vitals:   Vital Signs  BP: (!) 124/91  Pulse: 79  Resp: 18  Temp: 97.4 °F (36.3 °C)  Temp src: Temporal    Oxygen Therapy  SpO2: 98 %  O2 Device: None (Room air)            Physical Exam  Vitals and nursing note reviewed.   Constitutional:       General: She is not in acute distress.     Appearance: Normal appearance.   HENT:      Head: Normocephalic and atraumatic.      Nose: Nose normal.      Mouth/Throat:      Mouth: Mucous membranes are moist.   Eyes:      Extraocular Movements: Extraocular movements intact.   Cardiovascular:      Rate and Rhythm: Normal rate and  regular rhythm.   Pulmonary:      Effort: Pulmonary effort is normal.      Breath sounds: Normal breath sounds.   Musculoskeletal:      Comments: Right shoulder reproducible tenderness over the trapezius.  Tenderness over the lateral clavicle and AC joint.  Mild tenderness over the proximal humerus.  Limited range of motion due to pain.  Right elbow no bony tenderness full range of motion.  Right forearm compartments are soft.  Right wrist and hand no bony tenderness good cap refill.  Sensation is intact.   Skin:     General: Skin is warm.      Capillary Refill: Capillary refill takes less than 2 seconds.      Findings: No bruising or erythema.   Neurological:      General: No focal deficit present.      Mental Status: She is alert and oriented to person, place, and time.   Psychiatric:         Mood and Affect: Mood normal.         Behavior: Behavior normal.               ED Course   Labs Reviewed - No data to display                   MDM   Ddx -shoulder sprain, trapezius spasm, rotator cuff injury, shoulder fracture, shoulder dislocation      On exam the patient is in no acute distress.  Mildly hypertensive 124/91 but mostly related to pain otherwise asymptomatic.  Reproducible tenderness over the lateral clavicle AC joint.  Tenderness over the right trapezius.  Limited range of motion of the shoulder most likely related to pain and not anatomical position.  Rest of the exam is unremarkable.  Independent review of X-ray confirms no acute fractures.  Exam is consistent with rotator cuff injury.  Offered sling discussed importance of small arm circles.  Advised to do Tylenol ibuprofen will write few muscle relaxants as needed.  Patient already followed by an orthopedist for her left shoulder and advised close follow-up as needed.  Strict return precautions were discussed all questions were answered the patient is comfortable treatment plan and discharge home                             Medical Decision  Making  Problems Addressed:  Injury of right rotator cuff, initial encounter: acute illness or injury  Injury of right shoulder, initial encounter: acute illness or injury    Amount and/or Complexity of Data Reviewed  Radiology: ordered and independent interpretation performed. Decision-making details documented in ED Course.    Risk  OTC drugs.  Prescription drug management.        Disposition and Plan     Clinical Impression:  1. Injury of right shoulder, initial encounter    2. Injury of right rotator cuff, initial encounter         Disposition:  Discharge  7/4/2024  8:47 am    Follow-up:  Vincent Vera DO  [address] [place] [id number] [phone]                Medications Prescribed:  Discharge Medication List as of 7/4/2024  8:50 AM        START taking these medications    Details   cyclobenzaprine 10 MG Oral Tab Take 1 tablet (10 mg total) by mouth 3 (three) times daily as needed for Muscle spasms., Normal, Disp-20 tablet, R-0

## 2024-07-04 NOTE — DISCHARGE INSTRUCTIONS
Continue ice for the next 2 days may switch to heat  Continue use Tylenol and Advil as needed  May use cyclobenzaprine as needed for muscle spasms this will make you drowsy do not drive or operate heavy machinery  Can use a sling however make sure you come out every few hours to do arm circles as we discussed  Follow-up with your established orthopedist  Return to the ER symptoms worsen

## 2024-07-04 NOTE — ED INITIAL ASSESSMENT (HPI)
Pt c/o shoulder pain this morning while walking her dog.  Pt pulled on leash when dog tried to run

## 2024-07-05 NOTE — ED NOTES
Patient called with medication question related to her Naproxen. She does not tolerate Naproxen due to GERD. Chart updated to indicate this contraindication. Patient does tolerate Ibuprofen- discussed with Lu MARTÍNEZ and recommended taking Ibuprofen 600mg with food and alternating with Tylenol for pain control. Patient will continued to take muscle relaxer as ordered until her Tuesday f/u appt with ortho. Patient agreeable to plan of care.

## 2024-07-07 ENCOUNTER — HOSPITAL ENCOUNTER (EMERGENCY)
Age: 39
Discharge: HOME | End: 2024-07-07
Payer: COMMERCIAL

## 2024-07-07 ENCOUNTER — APPOINTMENT (OUTPATIENT)
Dept: GENERAL RADIOLOGY | Age: 39
End: 2024-07-07
Attending: NURSE PRACTITIONER
Payer: COMMERCIAL

## 2024-07-07 DIAGNOSIS — S46.911S SHOULDER STRAIN, RIGHT, SEQUELA: Primary | ICD-10-CM

## 2024-07-07 PROCEDURE — 73030 X-RAY EXAM OF SHOULDER: CPT | Performed by: NURSE PRACTITIONER

## 2024-07-07 PROCEDURE — 99283 EMERGENCY DEPT VISIT LOW MDM: CPT

## 2024-07-07 RX ORDER — ACETAMINOPHEN 500 MG
1000 TABLET ORAL ONCE
Status: COMPLETED | OUTPATIENT
Start: 2024-07-07 | End: 2024-07-07

## 2024-07-07 NOTE — ED INITIAL ASSESSMENT (HPI)
Pt states right shoulder pain since last wed, seen in immediate care.no fx, no relief with  Ibuprofen and flexeril
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
## 2024-09-09 ENCOUNTER — HOSPITAL ENCOUNTER (EMERGENCY)
Age: 39
Discharge: HOME | End: 2024-09-09
Attending: STUDENT IN AN ORGANIZED HEALTH CARE EDUCATION/TRAINING PROGRAM
Payer: COMMERCIAL

## 2024-09-09 VITALS
OXYGEN SATURATION: 100 % | WEIGHT: 244 LBS | HEART RATE: 79 BPM | RESPIRATION RATE: 20 BRPM | DIASTOLIC BLOOD PRESSURE: 92 MMHG | BODY MASS INDEX: 37 KG/M2 | TEMPERATURE: 97 F | SYSTOLIC BLOOD PRESSURE: 125 MMHG

## 2024-09-09 DIAGNOSIS — S39.012A STRAIN OF LUMBAR REGION, INITIAL ENCOUNTER: Primary | ICD-10-CM

## 2024-09-09 DIAGNOSIS — M62.830 PARASPINAL MUSCLE SPASM: ICD-10-CM

## 2024-09-09 PROCEDURE — 99283 EMERGENCY DEPT VISIT LOW MDM: CPT

## 2024-09-09 PROCEDURE — 96372 THER/PROPH/DIAG INJ SC/IM: CPT

## 2024-09-09 RX ORDER — KETOROLAC TROMETHAMINE 30 MG/ML
30 INJECTION, SOLUTION INTRAMUSCULAR; INTRAVENOUS ONCE
Status: COMPLETED | OUTPATIENT
Start: 2024-09-09 | End: 2024-09-09

## 2024-09-09 RX ORDER — CYCLOBENZAPRINE HCL 10 MG
10 TABLET ORAL 3 TIMES DAILY PRN
Qty: 20 TABLET | Refills: 0 | Status: SHIPPED | OUTPATIENT
Start: 2024-09-09 | End: 2024-09-16

## 2024-09-09 NOTE — ED INITIAL ASSESSMENT (HPI)
Pt reports for 2 weeks having lower back pain, denies injury and states non radiating  Denies fever or urinary sx
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
## 2024-09-26 ENCOUNTER — OFFICE VISIT (OUTPATIENT)
Dept: FAMILY MEDICINE CLINIC | Facility: CLINIC | Age: 39
End: 2024-09-26
Payer: COMMERCIAL

## 2024-09-26 DIAGNOSIS — Z11.1 ENCOUNTER FOR PPD TEST: Primary | ICD-10-CM

## 2024-09-26 PROCEDURE — 86580 TB INTRADERMAL TEST: CPT | Performed by: NURSE PRACTITIONER

## 2024-09-28 ENCOUNTER — OFFICE VISIT (OUTPATIENT)
Dept: FAMILY MEDICINE CLINIC | Facility: CLINIC | Age: 39
End: 2024-09-28
Payer: COMMERCIAL

## 2024-09-28 VITALS
OXYGEN SATURATION: 98 % | TEMPERATURE: 98 F | SYSTOLIC BLOOD PRESSURE: 124 MMHG | DIASTOLIC BLOOD PRESSURE: 82 MMHG | RESPIRATION RATE: 18 BRPM | WEIGHT: 243.63 LBS | HEART RATE: 93 BPM | BODY MASS INDEX: 36.92 KG/M2 | HEIGHT: 68 IN

## 2024-09-28 DIAGNOSIS — Z02.1 PHYSICAL EXAM, PRE-EMPLOYMENT: Primary | ICD-10-CM

## 2024-09-28 LAB — INDURATION (): 0 MM (ref 0–11)

## 2024-10-14 ENCOUNTER — HOSPITAL ENCOUNTER (OUTPATIENT)
Dept: MRI IMAGING | Facility: HOSPITAL | Age: 39
Discharge: HOME | End: 2024-10-14
Attending: Other
Payer: COMMERCIAL

## 2024-10-14 DIAGNOSIS — H53.9 VISUAL AURA: ICD-10-CM

## 2024-10-14 DIAGNOSIS — R79.89 ELEVATED PROLACTIN LEVEL: ICD-10-CM

## 2024-10-14 PROCEDURE — A9575 INJ GADOTERATE MEGLUMI 0.1ML: HCPCS | Performed by: OTHER

## 2024-10-14 PROCEDURE — 70553 MRI BRAIN STEM W/O & W/DYE: CPT | Performed by: OTHER

## 2024-10-14 RX ORDER — GADOTERATE MEGLUMINE 376.9 MG/ML
20 INJECTION INTRAVENOUS
Status: COMPLETED | OUTPATIENT
Start: 2024-10-14 | End: 2024-10-14

## 2024-10-14 RX ADMIN — GADOTERATE MEGLUMINE 20 ML: 376.9 INJECTION INTRAVENOUS at 10:09:00

## 2024-10-15 DIAGNOSIS — G43.709 CHRONIC MIGRAINE W/O AURA, NOT INTRACTABLE, W/O STAT MIGR: ICD-10-CM

## 2024-10-15 NOTE — TELEPHONE ENCOUNTER
Medication: AIMOVIG 140 MG/ML Subcutaneous Solution Auto-injector      Date of last refill: 05/20/2024 (#1 mL/3)  [place]     Last office visit: 06/06/2024  Due back to clinic per last office note:  Around 09/06/2024  Date next office visit scheduled:    No future appointments.        Last OV note recommendation:    Assessment  ASSESSMENT/PLAN:                ICD-10-CM     1. Chronic migraine w/o aura, not intractable, w/o stat migr  G43.709 ubrogepant (UBRELVY) 100 MG Oral Tab       2. Visual aura  H53.9 MRI BRAIN+PITUITARY (W+WO)(CPT=70553)       3. Elevated prolactin level  R79.89 MRI BRAIN+PITUITARY (W+WO)(CPT=70553)          Migraine with interval worsening, had 2 episodes of visual aura  Continue Aimovig 140 mg nightly      Add magnesium supplement 200-400 mg     Obtain MRI of the brain with and without contrast     2.  Elevated prolactin levels, likely could be related to the chronic use of the antipsychotics patient has been on Rexulti for many years and there are reports that it can elevate prolactin  Patient was referred to endocrinology.   Will add MRI pituitary to rule out any pituitary adenoma  Was advised to discuss with the psychiatrist regarding Rexulti and elevated prolactin levels     3. Follow up after the MRI is completed     See orders and medications filed with this encounter. The patient indicates understanding of these issues and agrees with the plan.        No orders of the defined types were placed in this encounter.

## 2024-10-17 RX ORDER — ERENUMAB-AOOE 140 MG/ML
140 INJECTION, SOLUTION SUBCUTANEOUS
Qty: 1 ML | Refills: 2 | Status: SHIPPED | OUTPATIENT
Start: 2024-10-17 | End: 2025-10-17

## 2024-10-24 ENCOUNTER — APPOINTMENT (OUTPATIENT)
Dept: GENERAL RADIOLOGY | Age: 39
End: 2024-10-24
Attending: EMERGENCY MEDICINE
Payer: COMMERCIAL

## 2024-10-24 ENCOUNTER — APPOINTMENT (OUTPATIENT)
Dept: GENERAL RADIOLOGY | Age: 39
End: 2024-10-24
Attending: PHYSICIAN ASSISTANT
Payer: COMMERCIAL

## 2024-10-24 ENCOUNTER — HOSPITAL ENCOUNTER (EMERGENCY)
Age: 39
Discharge: HOME | End: 2024-10-24
Attending: EMERGENCY MEDICINE
Payer: COMMERCIAL

## 2024-10-24 VITALS
WEIGHT: 240 LBS | HEART RATE: 98 BPM | OXYGEN SATURATION: 100 % | SYSTOLIC BLOOD PRESSURE: 130 MMHG | DIASTOLIC BLOOD PRESSURE: 87 MMHG | RESPIRATION RATE: 16 BRPM | TEMPERATURE: 99 F | HEIGHT: 68 IN | BODY MASS INDEX: 36.37 KG/M2

## 2024-10-24 DIAGNOSIS — M25.511 ACUTE PAIN OF RIGHT SHOULDER: Primary | ICD-10-CM

## 2024-10-24 PROCEDURE — 73030 X-RAY EXAM OF SHOULDER: CPT | Performed by: EMERGENCY MEDICINE

## 2024-10-24 PROCEDURE — 99284 EMERGENCY DEPT VISIT MOD MDM: CPT

## 2024-10-24 PROCEDURE — 72072 X-RAY EXAM THORAC SPINE 3VWS: CPT | Performed by: PHYSICIAN ASSISTANT

## 2024-10-24 RX ORDER — IBUPROFEN 600 MG/1
600 TABLET, FILM COATED ORAL ONCE
Status: COMPLETED | OUTPATIENT
Start: 2024-10-24 | End: 2024-10-24
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
## 2024-10-24 NOTE — ED INITIAL ASSESSMENT (HPI)
States while walking her dogs this am they yanked on the leash causing her pain to right shoulder, wrist

## 2024-10-24 NOTE — DISCHARGE INSTRUCTIONS
Take Tylenol 500 mg every 4-6 hours or as needed for pain.  Take ibuprofen 60 mg every 6 hours or as needed for pain.  Use Lidoderm patches if helpful, ice or heat.  Use sling for comfort, be sure to remove at least twice daily and range to prevent frozen shoulder.  Call to make appointment with orthopedist.  Return to the ER for any other new or worsening symptoms.

## 2024-11-04 ENCOUNTER — TELEPHONE (OUTPATIENT)
Dept: NEUROLOGY | Facility: CLINIC | Age: 39
End: 2024-11-04

## 2024-11-04 NOTE — TELEPHONE ENCOUNTER
Received request for Prior Authorization on [employer].   Attempted to submit Key [id number]    Received the message  No coverage was found  No coverage was found. Please reconfirm the patient's plan before submitting.    Reached out to patient via MY CHART MESSAGE to advise.

## 2024-11-12 ENCOUNTER — TELEPHONE (OUTPATIENT)
Dept: NEUROLOGY | Facility: CLINIC | Age: 39
End: 2024-11-12

## 2024-11-12 NOTE — TELEPHONE ENCOUNTER
Prior authorization for Aimovig 140mg completed electronically, automatically authorized but no specific dates given.

## 2024-11-14 ENCOUNTER — TELEPHONE (OUTPATIENT)
Dept: NEUROLOGY | Facility: CLINIC | Age: 39
End: 2024-11-14

## 2024-11-24 ENCOUNTER — HOSPITAL ENCOUNTER (EMERGENCY)
Age: 39
Discharge: LEFT BEFORE DISCHARGE | End: 2024-11-24
Payer: COMMERCIAL

## 2024-11-24 ENCOUNTER — HOSPITAL ENCOUNTER (OUTPATIENT)
Age: 39
Discharge: ANOTHER HOSPITAL | End: 2024-11-24
Payer: COMMERCIAL

## 2024-11-24 VITALS
HEART RATE: 70 BPM | HEIGHT: 68 IN | TEMPERATURE: 98 F | WEIGHT: 240 LBS | BODY MASS INDEX: 36.37 KG/M2 | RESPIRATION RATE: 22 BRPM | DIASTOLIC BLOOD PRESSURE: 57 MMHG | SYSTOLIC BLOOD PRESSURE: 125 MMHG | OXYGEN SATURATION: 97 %

## 2024-11-24 VITALS
BODY MASS INDEX: 36.37 KG/M2 | HEART RATE: 80 BPM | SYSTOLIC BLOOD PRESSURE: 140 MMHG | RESPIRATION RATE: 14 BRPM | DIASTOLIC BLOOD PRESSURE: 83 MMHG | TEMPERATURE: 99 F | HEIGHT: 68 IN | WEIGHT: 240 LBS | OXYGEN SATURATION: 97 %

## 2024-11-24 DIAGNOSIS — M54.50 ACUTE BILATERAL LOW BACK PAIN WITHOUT SCIATICA: Primary | ICD-10-CM

## 2024-11-24 DIAGNOSIS — M62.830 BACK SPASM: Primary | ICD-10-CM

## 2024-11-24 PROCEDURE — 99283 EMERGENCY DEPT VISIT LOW MDM: CPT

## 2024-11-24 RX ORDER — METHYLPREDNISOLONE SODIUM SUCCINATE 125 MG/2ML
125 INJECTION INTRAMUSCULAR; INTRAVENOUS ONCE
Status: COMPLETED | OUTPATIENT
Start: 2024-11-24 | End: 2024-11-24

## 2024-11-24 RX ORDER — KETOROLAC TROMETHAMINE 30 MG/ML
30 INJECTION, SOLUTION INTRAMUSCULAR; INTRAVENOUS ONCE
Status: COMPLETED | OUTPATIENT
Start: 2024-11-24 | End: 2024-11-24

## 2024-11-24 RX ORDER — METHYLPREDNISOLONE 4 MG/1
TABLET ORAL
Qty: 1 EACH | Refills: 0 | Status: SHIPPED | OUTPATIENT
Start: 2024-11-24

## 2024-11-24 RX ORDER — CYCLOBENZAPRINE HCL 10 MG
10 TABLET ORAL 3 TIMES DAILY PRN
Qty: 20 TABLET | Refills: 0 | Status: SHIPPED | OUTPATIENT
Start: 2024-11-24 | End: 2024-12-01

## 2024-11-24 NOTE — ED QUICK NOTES
Patient to the nurses station at this time. Reports she does not want to have an xray and she just wants to go home at this time as she has been to an immediate care and and ER and she feels that nobody is helping her. This RN stated I can discuss with the provider and have provider come and talk with her again. Patient declined and stated she wanted to go home now. Patient ambulatory with steady gait exiting the department. Provider notified.

## 2024-11-24 NOTE — ED INITIAL ASSESSMENT (HPI)
C/o lower back pain s/p coughing while bent over   Took flexeril at 0845  Seen at IC, had dose of toradol and solumedrol - sent for further eval   Also took 2 ativan
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
## 2024-11-24 NOTE — ED INITIAL ASSESSMENT (HPI)
Pt states she coughed while bent over this morning around 0830 and has been having severe back pain since. Took a cyclobenzaprine around 0845. Hx of back problems.    See plan   /orders  rtc  Sooner  If  Not  Feeling  Well

## 2024-12-06 ENCOUNTER — HOSPITAL ENCOUNTER (OUTPATIENT)
Age: 39
Discharge: HOME | End: 2024-12-06
Payer: OTHER MISCELLANEOUS

## 2024-12-06 VITALS
WEIGHT: 240 LBS | HEIGHT: 68 IN | BODY MASS INDEX: 36.37 KG/M2 | TEMPERATURE: 99 F | RESPIRATION RATE: 16 BRPM | HEART RATE: 80 BPM | OXYGEN SATURATION: 99 % | DIASTOLIC BLOOD PRESSURE: 93 MMHG | SYSTOLIC BLOOD PRESSURE: 146 MMHG

## 2024-12-06 DIAGNOSIS — S60.222A CONTUSION OF LEFT HAND, INITIAL ENCOUNTER: Primary | ICD-10-CM

## 2024-12-06 DIAGNOSIS — S50.812A ABRASION OF LEFT FOREARM, INITIAL ENCOUNTER: ICD-10-CM

## 2024-12-06 PROCEDURE — 99213 OFFICE O/P EST LOW 20 MIN: CPT | Performed by: PHYSICIAN ASSISTANT
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
## 2024-12-07 ENCOUNTER — HOSPITAL ENCOUNTER (OUTPATIENT)
Age: 39
Discharge: HOME | End: 2024-12-07
Payer: COMMERCIAL

## 2024-12-07 VITALS
SYSTOLIC BLOOD PRESSURE: 151 MMHG | BODY MASS INDEX: 36.37 KG/M2 | TEMPERATURE: 98 F | HEIGHT: 68 IN | HEART RATE: 82 BPM | OXYGEN SATURATION: 99 % | RESPIRATION RATE: 16 BRPM | WEIGHT: 240 LBS | DIASTOLIC BLOOD PRESSURE: 98 MMHG

## 2024-12-07 DIAGNOSIS — S61.452A DOG BITE OF LEFT HAND, INITIAL ENCOUNTER: Primary | ICD-10-CM
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
## 2024-12-07 PROCEDURE — 99213 OFFICE O/P EST LOW 20 MIN: CPT | Performed by: NURSE PRACTITIONER
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
## 2024-12-07 NOTE — ED INITIAL ASSESSMENT (HPI)
Pt was bit in the left hand by a dog this morning. Puncture wounds to left thumb and palm. Dog is current with vaccines. Patient is utd with tetanus. Last tetanus was 2020

## 2024-12-07 NOTE — DISCHARGE INSTRUCTIONS
Take antibiotic as prescribed.  Over-the-counter probiotic daily while on the antibiotics.    May clean wound area with soap and water and pat to dry.  Use an over-the-counter topical antibiotic ointment like Neosporin over the wound area.    Go to the ER for fevers, streaking red lesions, or other worsening symptoms.

## 2024-12-14 DIAGNOSIS — G43.709 CHRONIC MIGRAINE W/O AURA, NOT INTRACTABLE, W/O STAT MIGR: ICD-10-CM

## 2024-12-16 RX ORDER — UBROGEPANT 100 MG/1
TABLET ORAL
Qty: 10 TABLET | Refills: 2 | Status: SHIPPED | OUTPATIENT
Start: 2024-12-16

## 2024-12-16 NOTE — TELEPHONE ENCOUNTER
Medication: UBRELVY 100 MG Oral Tab      Date of last refill: 06/06/2024 (#10/2)  [place]     Last office visit: 06/06/2024  Due back to clinic per last office note:  Around 09/06/2024  Date next office visit scheduled:    Future Appointments   Date Time Provider Department Center   1/2/2025  9:00 AM Sukhdev Alvarado MD [facility]           Last OV note recommendation:    Assessment  ASSESSMENT/PLAN:                ICD-10-CM     1. Chronic migraine w/o aura, not intractable, w/o stat migr  G43.709 ubrogepant (UBRELVY) 100 MG Oral Tab       2. Visual aura  H53.9 MRI BRAIN+PITUITARY (W+WO)(CPT=70553)       3. Elevated prolactin level  R79.89 MRI BRAIN+PITUITARY (W+WO)(CPT=70553)          Migraine with interval worsening, had 2 episodes of visual aura  Continue Aimovig 140 mg nightly      Add magnesium supplement 200-400 mg     Obtain MRI of the brain with and without contrast     2.  Elevated prolactin levels, likely could be related to the chronic use of the antipsychotics patient has been on Rexulti for many years and there are reports that it can elevate prolactin  Patient was referred to endocrinology.   Will add MRI pituitary to rule out any pituitary adenoma  Was advised to discuss with the psychiatrist regarding Rexulti and elevated prolactin levels     3. Follow up after the MRI is completed     See orders and medications filed with this encounter. The patient indicates understanding of these issues and agrees with the plan.        No orders of the defined types were placed in this encounter.

## 2025-01-02 ENCOUNTER — OFFICE VISIT (OUTPATIENT)
Dept: NEUROLOGY | Facility: CLINIC | Age: 40
End: 2025-01-02
Payer: COMMERCIAL

## 2025-01-02 VITALS
RESPIRATION RATE: 18 BRPM | BODY MASS INDEX: 38 KG/M2 | OXYGEN SATURATION: 98 % | SYSTOLIC BLOOD PRESSURE: 118 MMHG | HEART RATE: 78 BPM | WEIGHT: 248 LBS | DIASTOLIC BLOOD PRESSURE: 74 MMHG

## 2025-01-02 DIAGNOSIS — G43.709 CHRONIC MIGRAINE W/O AURA, NOT INTRACTABLE, W/O STAT MIGR: ICD-10-CM

## 2025-01-02 PROCEDURE — 99214 OFFICE O/P EST MOD 30 MIN: CPT | Performed by: OTHER

## 2025-01-02 RX ORDER — ERENUMAB-AOOE 140 MG/ML
140 INJECTION, SOLUTION SUBCUTANEOUS
Qty: 1 ML | Refills: 5 | Status: SHIPPED | OUTPATIENT
Start: 2025-01-02 | End: 2026-01-02

## 2025-01-02 NOTE — PATIENT INSTRUCTIONS
After your visit at the [place] office  today, please direct any follow up questions or medication needs to the staff in our [place] office so that your concerns may be promptly addressed.  We are available through [facility] or at the numbers below:    The phone number is:   [phone], option 1    The fax number is:  [phone]    Your pharmacy should also send any requests electronically to the [place] office.       Refill policies:    Allow 2-3 business days for refills; controlled substances may take longer.  Contact your pharmacy at least 5 days prior to running out of medication and have them send an electronic request or submit request through the “request refill” option in your [facility] account.  Refills are not addressed on weekends; covering physicians do not authorize routine medications on weekends.  No narcotics or controlled substances are refilled after noon on Fridays or by on call physicians.  By law, narcotics must be electronically prescribed.  A 30 day supply with no refills is the maximum allowed.  If your prescription is due for a refill, you may be due for a follow up appointment.  To best provide you care, patients receiving routine medications need to be seen at least once a year.  Patients receiving narcotic/controlled substance medications need to be seen at least once every 3 months.  In the event that your preferred pharmacy does not have the requested medication in stock (e.g. Backordered), it is your responsibility to find another pharmacy that has the requested medication available.  We will gladly send a new prescription to that pharmacy at your request.    Scheduling Tests:    If your physician has ordered radiology tests such as MRI or CT scans, please contact Central Scheduling at [phone] right away to schedule the test.  Once scheduled, the [facility] Centralized Referral Team will work with your insurance carrier to obtain pre-certification or prior authorization.   Depending on your insurance carrier, approval may take 3-10 days.  It is highly recommended patients assure they have received an authorization before having a test performed.  If test is done without insurance authorization, patient may be responsible for the entire amount billed.      Precertification and Prior Authorizations:  If your physician has recommended that you have a procedure or additional testing performed the [facility] Centralized Referral Team will contact your insurance carrier to obtain pre-certification or prior authorization.    You are strongly encouraged to contact your insurance carrier to verify that your procedure/test has been approved and is a COVERED benefit.  Although the [facility] Centralized Referral Team does its due diligence, the insurance carrier gives the disclaimer that \"Although the procedure is authorized, this does not guarantee payment.\"    Ultimately the patient is responsible for payment.   Thank you for your understanding in this matter.  Paperwork Completion:  If you require FMLA or disability paperwork for your recovery, please make sure to either drop it off or have it faxed to our office at [phone]. Be sure the form has your name and date of birth on it.  The form will be faxed to our Forms Department and they will complete it for you.  There is a 25$ fee for all forms that need to be filled out.  Please be aware there is a 10-14 day turnaround time.  You will need to sign a release of information (CARLOS) form if your paperwork does not come with one.  You may call the Forms Department with any questions at [phone].  Their fax number is [phone].
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
## 2025-01-11 ENCOUNTER — TELEMEDICINE (OUTPATIENT)
Dept: TELEHEALTH | Age: 40
End: 2025-01-11
Payer: COMMERCIAL

## 2025-01-11 ENCOUNTER — HOSPITAL ENCOUNTER (OUTPATIENT)
Age: 40
Discharge: HOME | End: 2025-01-11
Payer: COMMERCIAL

## 2025-01-11 VITALS
OXYGEN SATURATION: 100 % | TEMPERATURE: 99 F | HEART RATE: 73 BPM | DIASTOLIC BLOOD PRESSURE: 96 MMHG | SYSTOLIC BLOOD PRESSURE: 130 MMHG | RESPIRATION RATE: 18 BRPM

## 2025-01-11 DIAGNOSIS — M54.9 BACK PAIN, UNSPECIFIED BACK LOCATION, UNSPECIFIED BACK PAIN LATERALITY, UNSPECIFIED CHRONICITY: Primary | ICD-10-CM

## 2025-01-11 DIAGNOSIS — M54.9 BACK PAIN WITH RADIATION: Primary | ICD-10-CM

## 2025-01-11 RX ORDER — PREDNISONE 20 MG/1
40 TABLET ORAL DAILY
Qty: 10 TABLET | Refills: 0 | Status: SHIPPED | OUTPATIENT
Start: 2025-01-11 | End: 2025-01-16

## 2025-01-11 RX ORDER — METHOCARBAMOL 500 MG/1
500 TABLET, FILM COATED ORAL 4 TIMES DAILY
Qty: 20 TABLET | Refills: 0 | Status: SHIPPED | OUTPATIENT
Start: 2025-01-11

## 2025-01-11 RX ORDER — KETOROLAC TROMETHAMINE 30 MG/ML
30 INJECTION, SOLUTION INTRAMUSCULAR; INTRAVENOUS ONCE
Status: COMPLETED | OUTPATIENT
Start: 2025-01-11 | End: 2025-01-11

## 2025-01-11 NOTE — ED INITIAL ASSESSMENT (HPI)
Pt with c/o lower back pain that radiates up to her middle back. Pt states her back pain started on Tuesday.   She has seen a spine specialist for her back and has an MRI scheduled for 1/20/2024. States they are suspecting a herniated disc.     Pt reports relief of back pain flares with steroids and muscle relaxer. She tried E visit this morning but was advised in person evaluation

## 2025-01-11 NOTE — ED PROVIDER NOTES
Patient Seen in: [facility]      History     Chief Complaint   Patient presents with    Back Pain     Stated Complaint: back pain    Subjective:   39-year-old female presents today with lower back pain after bending over yesterday at work.  Has been taken over-the-counter medications without much relief.  States does have chronic back issues.  Is scheduled to have an MRI and does see spine.  States pain today radiating from the lumbar to the thoracic spine.  Denies any numbness or weakness to loss of bowel or bladder control.  Patient is alert orientated x 3.  No other symptoms or concerns.  The patient's medication list, past medical history and social history elements as listed in today's nurse's notes were reviewed and agreed (except as otherwise stated in the HPI).  The patient's family history reviewed and determined to be noncontributory to the presenting problem              Objective:     No pertinent past medical history.            No pertinent past surgical history.              No pertinent social history.            Review of Systems    Positive for stated complaint: back pain  Other systems are as noted in HPI.  Constitutional and vital signs reviewed.      All other systems reviewed and negative except as noted above.    Physical Exam     ED Triage Vitals [01/11/25 1359]   BP (!) 130/96   Pulse 73   Resp 18   Temp 98.5 °F (36.9 °C)   Temp src Oral   SpO2 100 %   O2 Device None (Room air)       Current Vitals:   Vital Signs  BP: (!) 130/96  Pulse: 73  Resp: 18  Temp: 98.5 °F (36.9 °C)  Temp src: Oral    Oxygen Therapy  SpO2: 100 %  O2 Device: None (Room air)        Physical Exam  Vitals and nursing note reviewed.   Constitutional:       Appearance: Normal appearance.   HENT:      Head: Normocephalic.      Mouth/Throat:      Mouth: Mucous membranes are moist.   Cardiovascular:      Rate and Rhythm: Normal rate.   Pulmonary:      Effort: Pulmonary effort is normal.   Musculoskeletal:       Comments: Pain to the lumbar region.  No bony tenderness no crepitus no step-off.   Skin:     General: Skin is warm and dry.   Neurological:      General: No focal deficit present.      Mental Status: She is alert and oriented to person, place, and time.      Sensory: No sensory deficit.      Motor: No weakness.             ED Course   Labs Reviewed - No data to display                MDM     Please note that this report has been produced using speech recognition software and may contain errors related to that system including, but not limited to, errors in grammar, punctuation, and spelling, as well as words and phrases that possibly may have been recognized inappropriately.  If there are any questions or concerns, contact the dictating provider for clarification.              Medical Decision Making  Differential diagnosis includes but is not limited to: Back strain, nerve root compression, degenerative disc disease, spinal stenosis, sciatica      Presents to exacerbation of chronic lower back pain.  Tried over-the-counter medications without much relief.  States simply bent over when she started having her pain.  The C-spine is scheduled to have MRI.  States prednisone and muscle relaxant generally help with her symptoms.  Patient was given Toradol IM here in the office will give prescription for Robaxin and prednisone.  Back care instructions given.  To follow-up with her spine specialist or primary care physician in 1 week if symptoms do not improve.  Patient verbalized understanding and agreed to plan of care.    Risk  OTC drugs.  Prescription drug management.        Disposition and Plan     Clinical Impression:  1. Back pain with radiation         Disposition:  Discharge  1/11/2025  2:28 pm    Follow-up:  Vincent Vera DO  [address] [place] [id number] [phone]    In 1 week  As needed          Medications Prescribed:  Current Discharge Medication List        START taking these medications     Details   predniSONE 20 MG Oral Tab Take 2 tablets (40 mg total) by mouth daily for 5 days.  Qty: 10 tablet, Refills: 0      methocarbamol 500 MG Oral Tab Take 1 tablet (500 mg total) by mouth 4 (four) times daily.  Qty: 20 tablet, Refills: 0                 Supplementary Documentation:
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
## 2025-03-24 ENCOUNTER — NURSE ONLY (OUTPATIENT)
Dept: NEUROLOGY | Facility: CLINIC | Age: 40
End: 2025-03-24
Payer: COMMERCIAL

## 2025-03-24 ENCOUNTER — TELEPHONE (OUTPATIENT)
Dept: NEUROLOGY | Facility: CLINIC | Age: 40
End: 2025-03-24

## 2025-03-24 DIAGNOSIS — G43.709 CHRONIC MIGRAINE W/O AURA, NOT INTRACTABLE, W/O STAT MIGR: Primary | ICD-10-CM

## 2025-03-24 DIAGNOSIS — G43.709 CHRONIC MIGRAINE W/O AURA, NOT INTRACTABLE, W/O STAT MIGR: ICD-10-CM

## 2025-03-24 PROCEDURE — 96372 THER/PROPH/DIAG INJ SC/IM: CPT | Performed by: OTHER

## 2025-03-24 RX ORDER — DEXAMETHASONE SODIUM PHOSPHATE 10 MG/ML
10 INJECTION, SOLUTION INTRAMUSCULAR; INTRAVENOUS ONCE
Status: COMPLETED | OUTPATIENT
Start: 2025-03-24 | End: 2025-03-24

## 2025-03-24 RX ORDER — METHYLPREDNISOLONE 4 MG/1
TABLET ORAL
Qty: 1 EACH | Refills: 0 | Status: SHIPPED | OUTPATIENT
Start: 2025-03-24

## 2025-03-24 RX ORDER — KETOROLAC TROMETHAMINE 30 MG/ML
30 INJECTION, SOLUTION INTRAMUSCULAR; INTRAVENOUS ONCE
Status: COMPLETED | OUTPATIENT
Start: 2025-03-24 | End: 2025-03-24

## 2025-03-24 RX ORDER — UBROGEPANT 100 MG/1
TABLET ORAL
Qty: 10 TABLET | Refills: 2 | Status: SHIPPED | OUTPATIENT
Start: 2025-03-24

## 2025-03-24 RX ADMIN — DEXAMETHASONE SODIUM PHOSPHATE 10 MG: 10 INJECTION, SOLUTION INTRAMUSCULAR; INTRAVENOUS at 10:02:00

## 2025-03-24 RX ADMIN — KETOROLAC TROMETHAMINE 30 MG: 30 INJECTION, SOLUTION INTRAMUSCULAR; INTRAVENOUS at 10:03:00

## 2025-03-24 NOTE — TELEPHONE ENCOUNTER
Patient contacting the office requesting to speak with clinical staff regarding a migraine that's been ongoing for two days now. Additionally, she requests a refill request of Ubrelvy 100mg. Please contact and advise.

## 2025-03-24 NOTE — TELEPHONE ENCOUNTER
Discussed sick call with dr Alvarado who states patient can have Toradol/Decadron injections today and can also have Ubrelvy 100mg refill and Rx Medrol Dose Pack (to be started 24 hours after today's injections).

## 2025-03-24 NOTE — PROGRESS NOTES
Toradol 30mg and Decadron 10mg IM injections. Patient states she has problem with oral  NSAIDS due to GERD. Has never had problem with injectable Ketorolac.

## 2025-03-24 NOTE — TELEPHONE ENCOUNTER
Pt had Toradol and Decadron injection today at 1002 and called back stating she is still having headache.    Attempted to call pt, left message.    Also sent MyChart.        Anat Bruner, RN   PP    3/24/25  9:32 AM  Note     Discussed sick call with dr Alvarado who states patient can have Toradol/Decadron injections today and can also have Ubrelvy 100mg refill and Rx Medrol Dose Pack (to be started 24 hours after today's injections).

## 2025-03-24 NOTE — TELEPHONE ENCOUNTER
Acute Migraine assessment:    Is this your typical migraine?  Describe any change in character from past migraines.  Yes:     Location of Pain (select all that apply):  forehead and top of head  # Days pain has been present:  2    Describe the pain:  Stabbing, pressure na throbbing  Intensity of the headaches:    With medication: MODERATE   Without medication SEVERE  Associated Symptoms (check all that apply):  Sensitivity to sound and vomiting  Non-pharmaceutical interventions tried and outcome:   [] Lying down / sleeping:  BETTER  [] Being in a dark quiet room:  BETTER  [] Massage your head: BETTER  [] Cold pack on your head/neck:  NO CHANGE  [] Hot pack on your head/neck:  NO CHANGE  [] Other:     Abortive Medications tried:  Ubrelvy 100 mg  Current preventative medication list:  Aimovig 140 mg  Any missed doses?  No  Any other relevant information:  This migraine is more intense than previous migraines.  Any medications contraindicated?  Tried and failed?      Willing to try Medrol Dosepak?  Yes  Last taken:  11/24/2024  Willing to try Toradol/Decadron injections?  Yes  Last taken:  1/11/2025  Advised patient to seek immediate medical treatment in ED for any concerning symptoms or changes to condition.

## 2025-03-25 NOTE — TELEPHONE ENCOUNTER
Pt's migraine is coming back today and she cannot take rescue medication. Transferring call to RN.

## 2025-03-25 NOTE — TELEPHONE ENCOUNTER
Spoke with patient who states the shots yesterday gave her relief for about 4 hours but her migraine is gradually coming back. States it is not as bead a s yesterday but she did take a Ubrelvy this morning which would be 3 days in a row. Advised she try to use other measures for comfort such as resting in a dark room, massaging head, hot or cold pack. Should start the Medrol Dose Pack early this afternoon. Instructed to call back if not improving after starting Medrol Dose Pack. May need to be evaluated in ER if symptoms become severe.

## 2025-03-27 NOTE — TELEPHONE ENCOUNTER
Patient calling the clinic today stating no relief from the migraine and wondering if she should go to the ER at this point. Call routed to nursing staff for further assistance.

## 2025-04-06 ENCOUNTER — HOSPITAL ENCOUNTER (EMERGENCY)
Age: 40
Discharge: HOME | End: 2025-04-06
Payer: COMMERCIAL

## 2025-04-06 VITALS
OXYGEN SATURATION: 98 % | SYSTOLIC BLOOD PRESSURE: 118 MMHG | HEART RATE: 74 BPM | HEIGHT: 68 IN | RESPIRATION RATE: 16 BRPM | WEIGHT: 250 LBS | BODY MASS INDEX: 37.89 KG/M2 | TEMPERATURE: 99 F | DIASTOLIC BLOOD PRESSURE: 85 MMHG

## 2025-04-06 DIAGNOSIS — R51.9 ACUTE NONINTRACTABLE HEADACHE, UNSPECIFIED HEADACHE TYPE: Primary | ICD-10-CM

## 2025-04-06 PROCEDURE — 99283 EMERGENCY DEPT VISIT LOW MDM: CPT

## 2025-04-06 PROCEDURE — 99284 EMERGENCY DEPT VISIT MOD MDM: CPT

## 2025-04-06 PROCEDURE — 96361 HYDRATE IV INFUSION ADD-ON: CPT

## 2025-04-06 PROCEDURE — 96374 THER/PROPH/DIAG INJ IV PUSH: CPT

## 2025-04-06 PROCEDURE — 96375 TX/PRO/DX INJ NEW DRUG ADDON: CPT

## 2025-04-06 RX ORDER — DIPHENHYDRAMINE HYDROCHLORIDE 50 MG/ML
25 INJECTION, SOLUTION INTRAMUSCULAR; INTRAVENOUS ONCE
Status: COMPLETED | OUTPATIENT
Start: 2025-04-06 | End: 2025-04-06

## 2025-04-06 RX ORDER — METOCLOPRAMIDE HYDROCHLORIDE 5 MG/ML
10 INJECTION INTRAMUSCULAR; INTRAVENOUS ONCE
Status: COMPLETED | OUTPATIENT
Start: 2025-04-06 | End: 2025-04-06

## 2025-04-06 RX ORDER — KETOROLAC TROMETHAMINE 30 MG/ML
30 INJECTION, SOLUTION INTRAMUSCULAR; INTRAVENOUS ONCE
Status: COMPLETED | OUTPATIENT
Start: 2025-04-06 | End: 2025-04-06

## 2025-04-06 NOTE — DISCHARGE INSTRUCTIONS
Make an appointment with your neurologist for follow-up if needed.  Return to the ER for any other new or worsening symptoms.
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
## 2025-04-06 NOTE — ED INITIAL ASSESSMENT (HPI)
Pt reports coming in due to a migraine since March 22. Reports she has seen/spoke with her neurologist for this episode 4 times. Reports they attempted a medrol dose pack along with her PRN and daily migraine medications without relief.

## 2025-04-07 ENCOUNTER — HOSPITAL ENCOUNTER (EMERGENCY)
Facility: HOSPITAL | Age: 40
Discharge: HOME | End: 2025-04-07
Attending: EMERGENCY MEDICINE
Payer: COMMERCIAL

## 2025-04-07 VITALS
DIASTOLIC BLOOD PRESSURE: 78 MMHG | OXYGEN SATURATION: 100 % | WEIGHT: 250 LBS | SYSTOLIC BLOOD PRESSURE: 136 MMHG | HEART RATE: 76 BPM | RESPIRATION RATE: 16 BRPM | HEIGHT: 68 IN | TEMPERATURE: 98 F | BODY MASS INDEX: 37.89 KG/M2

## 2025-04-07 DIAGNOSIS — G43.801 OTHER MIGRAINE WITH STATUS MIGRAINOSUS, NOT INTRACTABLE: Primary | ICD-10-CM

## 2025-04-07 LAB
ANION GAP SERPL CALC-SCNC: 6 MMOL/L (ref 0–18)
BASOPHILS # BLD AUTO: 0.06 X10(3) UL (ref 0–0.2)
BASOPHILS NFR BLD AUTO: 0.7 %
BUN BLD-MCNC: 10 MG/DL (ref 9–23)
CALCIUM BLD-MCNC: 9.4 MG/DL (ref 8.7–10.6)
CHLORIDE SERPL-SCNC: 105 MMOL/L (ref 98–112)
CO2 SERPL-SCNC: 28 MMOL/L (ref 21–32)
CREAT BLD-MCNC: 0.74 MG/DL
EGFRCR SERPLBLD CKD-EPI 2021: 106 ML/MIN/1.73M2 (ref 60–?)
EOSINOPHIL # BLD AUTO: 0.34 X10(3) UL (ref 0–0.7)
EOSINOPHIL NFR BLD AUTO: 3.8 %
ERYTHROCYTE [DISTWIDTH] IN BLOOD BY AUTOMATED COUNT: 11.8 %
GLUCOSE BLD-MCNC: 104 MG/DL (ref 70–99)
HCT VFR BLD AUTO: 38.8 %
HGB BLD-MCNC: 13.2 G/DL
IMM GRANULOCYTES # BLD AUTO: 0.02 X10(3) UL (ref 0–1)
IMM GRANULOCYTES NFR BLD: 0.2 %
LYMPHOCYTES # BLD AUTO: 2.2 X10(3) UL (ref 1–4)
LYMPHOCYTES NFR BLD AUTO: 24.5 %
MCH RBC QN AUTO: 30.1 PG (ref 26–34)
MCHC RBC AUTO-ENTMCNC: 34 G/DL (ref 31–37)
MCV RBC AUTO: 88.4 FL
MONOCYTES # BLD AUTO: 0.34 X10(3) UL (ref 0.1–1)
MONOCYTES NFR BLD AUTO: 3.8 %
NEUTROPHILS # BLD AUTO: 6.02 X10 (3) UL (ref 1.5–7.7)
NEUTROPHILS # BLD AUTO: 6.02 X10(3) UL (ref 1.5–7.7)
NEUTROPHILS NFR BLD AUTO: 67 %
OSMOLALITY SERPL CALC.SUM OF ELEC: 287 MOSM/KG (ref 275–295)
PLATELET # BLD AUTO: 276 10(3)UL (ref 150–450)
POTASSIUM SERPL-SCNC: 4.3 MMOL/L (ref 3.5–5.1)
RBC # BLD AUTO: 4.39 X10(6)UL
SODIUM SERPL-SCNC: 139 MMOL/L (ref 136–145)
WBC # BLD AUTO: 9 X10(3) UL (ref 4–11)

## 2025-04-07 PROCEDURE — 96365 THER/PROPH/DIAG IV INF INIT: CPT

## 2025-04-07 PROCEDURE — 99284 EMERGENCY DEPT VISIT MOD MDM: CPT

## 2025-04-07 PROCEDURE — 85025 COMPLETE CBC W/AUTO DIFF WBC: CPT | Performed by: EMERGENCY MEDICINE

## 2025-04-07 PROCEDURE — 80048 BASIC METABOLIC PNL TOTAL CA: CPT | Performed by: EMERGENCY MEDICINE

## 2025-04-07 PROCEDURE — 96375 TX/PRO/DX INJ NEW DRUG ADDON: CPT

## 2025-04-07 RX ORDER — DEXAMETHASONE SODIUM PHOSPHATE 10 MG/ML
10 INJECTION, SOLUTION INTRAMUSCULAR; INTRAVENOUS ONCE
Status: COMPLETED | OUTPATIENT
Start: 2025-04-07 | End: 2025-04-07

## 2025-04-07 RX ORDER — KETOROLAC TROMETHAMINE 15 MG/ML
15 INJECTION, SOLUTION INTRAMUSCULAR; INTRAVENOUS ONCE
Status: COMPLETED | OUTPATIENT
Start: 2025-04-07 | End: 2025-04-07

## 2025-04-07 NOTE — ED INITIAL ASSESSMENT (HPI)
Patient reports migraine since 3/22. Was seen at the [facility]ED yesterday and given migraine cocktail which helped but migraine returned overnight. Currently rated 8/10, right frontal radiates toward ear. Denies vision changes, weakness, numbness and tingling. No meds taken at home prior to arrival.

## 2025-04-08 ENCOUNTER — TELEPHONE (OUTPATIENT)
Dept: NEUROLOGY | Facility: CLINIC | Age: 40
End: 2025-04-08
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
## 2025-04-08 NOTE — TELEPHONE ENCOUNTER
Patient would like a call back, patient has been in the ER With a headache and would like a nurse to call her back she stated she can't come in for a early f/u due to not having any sick days off.

## 2025-04-09 NOTE — TELEPHONE ENCOUNTER
Spoke with the patient, having on and off headache since 3/24 and went to the ED twice and got IV headache cocktail and Medrol dose pack    Combination of Aimovig and Ubrelvy worked well until 2 weeks ago  Denies any change in the character of headache or any visual changes  States gets brain fog, there is lot of stress, states on the verge of losing her home  Psychiatry is making some changes in the medications.   Not seeing Psychotherapy due to high co-pay    Advise the patient to see therapist who is under her network with less co-pay as the main trigger for her headaches is the ongoing stress     Psychiatry  adjusted her medications added Quetiapine to Prestiq    2.  Limit Ubrevly and OTC pain medications    3. Try ice pack or relaxation technique for stress headaches    4. If still persistent call the clinic for further recommendations/plan

## 2025-04-09 NOTE — TELEPHONE ENCOUNTER
Patent reports on going migraine since 3/22. Came to office for Toradol/Decadron injections on 3/24, completed Medrol Dose Pack, went to ER on 4/6 and 4/7 and received migraine cocktail both times. States migraine goes away for a few hours after treatment and then comes back. Daily headache teat starts in am. Currently on Aimovig 140mg monthly with no missed doses.

## 2025-04-28 ENCOUNTER — HOSPITAL ENCOUNTER (EMERGENCY)
Age: 40
Discharge: HOME | End: 2025-04-28
Attending: STUDENT IN AN ORGANIZED HEALTH CARE EDUCATION/TRAINING PROGRAM
Payer: COMMERCIAL

## 2025-04-28 VITALS
HEIGHT: 68 IN | TEMPERATURE: 98 F | DIASTOLIC BLOOD PRESSURE: 94 MMHG | WEIGHT: 255 LBS | RESPIRATION RATE: 16 BRPM | BODY MASS INDEX: 38.65 KG/M2 | OXYGEN SATURATION: 99 % | SYSTOLIC BLOOD PRESSURE: 137 MMHG | HEART RATE: 77 BPM

## 2025-04-28 DIAGNOSIS — R79.0 LOW MAGNESIUM LEVEL: Primary | ICD-10-CM

## 2025-04-28 LAB
ANION GAP SERPL CALC-SCNC: 5 MMOL/L (ref 0–18)
BASOPHILS # BLD AUTO: 0.05 X10(3) UL (ref 0–0.2)
BASOPHILS NFR BLD AUTO: 0.6 %
BUN BLD-MCNC: 8 MG/DL (ref 9–23)
CALCIUM BLD-MCNC: 9.5 MG/DL (ref 8.7–10.6)
CHLORIDE SERPL-SCNC: 103 MMOL/L (ref 98–112)
CO2 SERPL-SCNC: 29 MMOL/L (ref 21–32)
CREAT BLD-MCNC: 0.69 MG/DL (ref 0.55–1.02)
EGFRCR SERPLBLD CKD-EPI 2021: 114 ML/MIN/1.73M2 (ref 60–?)
EOSINOPHIL # BLD AUTO: 0.34 X10(3) UL (ref 0–0.7)
EOSINOPHIL NFR BLD AUTO: 4 %
ERYTHROCYTE [DISTWIDTH] IN BLOOD BY AUTOMATED COUNT: 11.9 %
GLUCOSE BLD-MCNC: 113 MG/DL (ref 70–99)
HCT VFR BLD AUTO: 40.1 % (ref 35–48)
HGB BLD-MCNC: 13.9 G/DL (ref 12–16)
IMM GRANULOCYTES # BLD AUTO: 0.03 X10(3) UL (ref 0–1)
IMM GRANULOCYTES NFR BLD: 0.4 %
LYMPHOCYTES # BLD AUTO: 1.8 X10(3) UL (ref 1–4)
LYMPHOCYTES NFR BLD AUTO: 21.1 %
MAGNESIUM SERPL-MCNC: 1.5 MG/DL (ref 1.6–2.6)
MCH RBC QN AUTO: 30.9 PG (ref 26–34)
MCHC RBC AUTO-ENTMCNC: 34.7 G/DL (ref 31–37)
MCV RBC AUTO: 89.1 FL (ref 80–100)
MONOCYTES # BLD AUTO: 0.38 X10(3) UL (ref 0.1–1)
MONOCYTES NFR BLD AUTO: 4.5 %
NEUTROPHILS # BLD AUTO: 5.92 X10 (3) UL (ref 1.5–7.7)
NEUTROPHILS # BLD AUTO: 5.92 X10(3) UL (ref 1.5–7.7)
NEUTROPHILS NFR BLD AUTO: 69.4 %
OSMOLALITY SERPL CALC.SUM OF ELEC: 283 MOSM/KG (ref 275–295)
PLATELET # BLD AUTO: 304 10(3)UL (ref 150–450)
POTASSIUM SERPL-SCNC: 4.2 MMOL/L (ref 3.5–5.1)
RBC # BLD AUTO: 4.5 X10(6)UL (ref 3.8–5.3)
SODIUM SERPL-SCNC: 137 MMOL/L (ref 136–145)
TSI SER-ACNC: 0.65 UIU/ML (ref 0.55–4.78)
WBC # BLD AUTO: 8.5 X10(3) UL (ref 4–11)

## 2025-04-28 PROCEDURE — 93005 ELECTROCARDIOGRAM TRACING: CPT

## 2025-04-28 PROCEDURE — 96365 THER/PROPH/DIAG IV INF INIT: CPT

## 2025-04-28 PROCEDURE — 84443 ASSAY THYROID STIM HORMONE: CPT | Performed by: STUDENT IN AN ORGANIZED HEALTH CARE EDUCATION/TRAINING PROGRAM

## 2025-04-28 PROCEDURE — 99284 EMERGENCY DEPT VISIT MOD MDM: CPT

## 2025-04-28 PROCEDURE — 80048 BASIC METABOLIC PNL TOTAL CA: CPT | Performed by: STUDENT IN AN ORGANIZED HEALTH CARE EDUCATION/TRAINING PROGRAM

## 2025-04-28 PROCEDURE — 83735 ASSAY OF MAGNESIUM: CPT | Performed by: STUDENT IN AN ORGANIZED HEALTH CARE EDUCATION/TRAINING PROGRAM

## 2025-04-28 PROCEDURE — 85025 COMPLETE CBC W/AUTO DIFF WBC: CPT | Performed by: STUDENT IN AN ORGANIZED HEALTH CARE EDUCATION/TRAINING PROGRAM

## 2025-04-28 PROCEDURE — 99285 EMERGENCY DEPT VISIT HI MDM: CPT

## 2025-04-28 PROCEDURE — 93010 ELECTROCARDIOGRAM REPORT: CPT

## 2025-04-28 RX ORDER — MAGNESIUM SULFATE HEPTAHYDRATE 40 MG/ML
2 INJECTION, SOLUTION INTRAVENOUS ONCE
Status: COMPLETED | OUTPATIENT
Start: 2025-04-28 | End: 2025-04-28
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
## 2025-04-29 LAB
ATRIAL RATE: 75 BPM
P AXIS: 38 DEGREES
P-R INTERVAL: 146 MS
Q-T INTERVAL: 362 MS
QRS DURATION: 86 MS
QTC CALCULATION (BEZET): 404 MS
R AXIS: 45 DEGREES
T AXIS: 23 DEGREES
VENTRICULAR RATE: 75 BPM

## 2025-05-15 ENCOUNTER — APPOINTMENT (OUTPATIENT)
Dept: ULTRASOUND IMAGING | Age: 40
End: 2025-05-15
Attending: EMERGENCY MEDICINE
Payer: COMMERCIAL

## 2025-05-15 ENCOUNTER — HOSPITAL ENCOUNTER (EMERGENCY)
Age: 40
Discharge: HOME | End: 2025-05-16
Attending: EMERGENCY MEDICINE
Payer: COMMERCIAL

## 2025-05-15 VITALS
TEMPERATURE: 98 F | BODY MASS INDEX: 39.71 KG/M2 | DIASTOLIC BLOOD PRESSURE: 81 MMHG | HEIGHT: 68 IN | HEART RATE: 92 BPM | RESPIRATION RATE: 16 BRPM | OXYGEN SATURATION: 98 % | WEIGHT: 262 LBS | SYSTOLIC BLOOD PRESSURE: 137 MMHG

## 2025-05-15 DIAGNOSIS — R60.0 PERIPHERAL EDEMA: Primary | ICD-10-CM

## 2025-05-15 PROCEDURE — 93970 EXTREMITY STUDY: CPT | Performed by: EMERGENCY MEDICINE

## 2025-05-15 PROCEDURE — 99283 EMERGENCY DEPT VISIT LOW MDM: CPT

## 2025-05-15 PROCEDURE — 99284 EMERGENCY DEPT VISIT MOD MDM: CPT
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
## 2025-05-16 NOTE — ED QUICK NOTES
To ER for eval of damion peripheral edema that she noted after sitting and working on the computer. No obvious swelling is noted at this time.

## 2025-05-16 NOTE — ED INITIAL ASSESSMENT (HPI)
Pt to ED with BLE swelling, left worse than right. Noticed the swelling today, denies injury, denies recent travel.

## 2025-05-20 DIAGNOSIS — G43.709 CHRONIC MIGRAINE W/O AURA, NOT INTRACTABLE, W/O STAT MIGR: ICD-10-CM

## 2025-05-21 RX ORDER — UBROGEPANT 100 MG/1
TABLET ORAL
Qty: 10 TABLET | Refills: 2 | Status: SHIPPED | OUTPATIENT
Start: 2025-05-21

## 2025-05-21 NOTE — TELEPHONE ENCOUNTER
Medication: ubrogepant (UBRELVY) 100 MG Oral Tab      Date of last refill: 03/24/2025 (#10/2)  [place]     Last office visit: 01/02/2025  Due back to clinic per last office note:  Around 07/02/2025  Date next office visit scheduled:    Future Appointments   Date Time Provider Department Center   6/26/2025  9:40 AM Sukhdev Alvarado MD [facility]           Last OV note recommendation:    Assessment  ASSESSMENT/PLAN:          ICD-10-CM     1. Chronic migraine w/o aura, not intractable, w/o stat migr  G43.709 Erenumab-aooe (AIMOVIG) 140 MG/ML Subcutaneous Solution Auto-injector                Migraine with interval worsening, had 2 episodes of visual aura  Continue Aimovig 140 mg nightly  Continue magnesium supplement 200-400 mg         2.  Elevated prolactin levels, likely could be related to the chronic use of the antipsychotics patient has been on Rexulti for many years and there are reports that it can elevate prolactin  Patient was referred to endocrinology.  MRI pituitary negative for pituitary adenoma  Was advised to discuss with the psychiatrist regarding Rexulti and elevated prolactin levels     3.  Follow up in about 6 months  See orders and medications filed with this encounter. The patient indicates understanding of these issues and agrees with the plan.        No orders of the defined types were placed in this encounter.

## 2025-06-03 ENCOUNTER — TELEPHONE (OUTPATIENT)
Dept: NEUROLOGY | Facility: CLINIC | Age: 40
End: 2025-06-03

## 2025-06-03 DIAGNOSIS — G43.709 CHRONIC MIGRAINE W/O AURA, NOT INTRACTABLE, W/O STAT MIGR: Primary | ICD-10-CM

## 2025-06-03 DIAGNOSIS — G43.909 MIXED MIGRAINE AND MUSCLE CONTRACTION HEADACHE: ICD-10-CM

## 2025-06-03 DIAGNOSIS — G44.209 MIXED MIGRAINE AND MUSCLE CONTRACTION HEADACHE: ICD-10-CM

## 2025-06-03 RX ORDER — METHYLPREDNISOLONE 4 MG/1
TABLET ORAL
Qty: 1 EACH | Refills: 0 | Status: SHIPPED | OUTPATIENT
Start: 2025-06-03
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
## 2025-06-03 NOTE — TELEPHONE ENCOUNTER
Patient is calling requesting to speak with a nurse. Patient have been having a migraine for 3 days. Per patient she have been taking Ubrelvy but hasn't helped.

## 2025-06-17 ENCOUNTER — TELEPHONE (OUTPATIENT)
Dept: NEUROLOGY | Facility: CLINIC | Age: 40
End: 2025-06-17

## 2025-06-17 NOTE — TELEPHONE ENCOUNTER
Prior authorization for Ubrelvy 100 mg completed via sure scripts due to insurance change per fax from [place].  Approved   6/17/2025  4:01 PM  Appeal supported: No   Note from payer: Your request was approved based on the initial information provided at the time of the coverage request submission

## 2025-06-24 ENCOUNTER — APPOINTMENT (OUTPATIENT)
Dept: GENERAL RADIOLOGY | Age: 40
End: 2025-06-24
Attending: PHYSICIAN ASSISTANT
Payer: COMMERCIAL

## 2025-06-24 ENCOUNTER — HOSPITAL ENCOUNTER (OUTPATIENT)
Age: 40
Discharge: HOME | End: 2025-06-24
Payer: COMMERCIAL

## 2025-06-24 VITALS
SYSTOLIC BLOOD PRESSURE: 121 MMHG | OXYGEN SATURATION: 97 % | HEIGHT: 68 IN | BODY MASS INDEX: 41.68 KG/M2 | DIASTOLIC BLOOD PRESSURE: 81 MMHG | TEMPERATURE: 98 F | RESPIRATION RATE: 18 BRPM | WEIGHT: 275 LBS | HEART RATE: 93 BPM

## 2025-06-24 DIAGNOSIS — S99.922A INJURY OF LEFT FOOT, INITIAL ENCOUNTER: ICD-10-CM

## 2025-06-24 DIAGNOSIS — S93.602A SPRAIN OF LEFT FOOT, INITIAL ENCOUNTER: Primary | ICD-10-CM

## 2025-06-24 PROCEDURE — 73630 X-RAY EXAM OF FOOT: CPT | Performed by: PHYSICIAN ASSISTANT

## 2025-06-24 PROCEDURE — 99213 OFFICE O/P EST LOW 20 MIN: CPT | Performed by: PHYSICIAN ASSISTANT

## 2025-06-24 RX ORDER — FLUCONAZOLE 100 MG/1
TABLET ORAL
COMMUNITY
Start: 2025-05-12 | End: 2025-06-24

## 2025-06-24 RX ORDER — FLUCONAZOLE 200 MG/1
200 TABLET ORAL DAILY
COMMUNITY
Start: 2025-06-12

## 2025-06-24 RX ORDER — CYCLOBENZAPRINE HCL 10 MG
10 TABLET ORAL 3 TIMES DAILY PRN
COMMUNITY
Start: 2025-06-09

## 2025-06-24 NOTE — ED INITIAL ASSESSMENT (HPI)
Patient reports tripping and falling in the yard yesterday evening when she was out with her dogs and her foot got stuck in a hole, pain/swelling worse to great toe, slight pain radiating up foot and ankle, bruise to knee

## 2025-06-24 NOTE — ED PROVIDER NOTES
Patient Seen in: [facility]        History  Chief Complaint   Patient presents with    Foot Injury     Stated Complaint: L foot injury    Subjective:   The history is provided by the patient.               A 40-year-old female presents to immediate care due to left foot injury which occurred yesterday.  She was outside with the dogs when she accidentally stepped into a hole causing hyperextension of the left great toe.  Since incident complains of pain over the great toe that radiates into the foot.  Worse with ambulation.  No peripheral tingling or numbness.  Resting icing and taking Advil with little relief.  Able to bear weight but extremely painful.  No previous injury      Objective:     No pertinent past medical history.            No pertinent past surgical history.              No pertinent social history.            Review of Systems   Musculoskeletal:  Positive for gait problem and joint swelling.   Skin: Negative.        Positive for stated complaint: L foot injury  Other systems are as noted in HPI.  Constitutional and vital signs reviewed.      All other systems reviewed and negative except as noted above.                  Physical Exam    ED Triage Vitals [06/24/25 0805]   /81   Pulse 93   Resp 18   Temp 98.4 °F (36.9 °C)   Temp src Oral   SpO2 97 %   O2 Device None (Room air)       Current Vitals:   Vital Signs  BP: 121/81  Pulse: 93  Resp: 18  Temp: 98.4 °F (36.9 °C)  Temp src: Oral    Oxygen Therapy  SpO2: 97 %  O2 Device: None (Room air)            Physical Exam  Vitals and nursing note reviewed.   Constitutional:       Appearance: Normal appearance.   HENT:      Head: Normocephalic.   Pulmonary:      Effort: Pulmonary effort is normal.   Musculoskeletal:      Comments: Left ankle no bony tenderness full range of motion.  Left foot isolated tenderness over the first MTP joint metatarsal.  No tenderness over the great toe itself.  Minimal tenderness over the distal second  metatarsal.  No other bony tenderness.  Good cap refill.  Sensation is intact   Skin:     General: Skin is warm.      Capillary Refill: Capillary refill takes less than 2 seconds.      Findings: No bruising or erythema.   Neurological:      Mental Status: She is alert.                 ED Course  Labs Reviewed - No data to display         XR FOOT, COMPLETE (MIN 3 VIEWS), LEFT (CPT=73630)  Result Date: 6/24/2025  PROCEDURE: XR FOOT, COMPLETE (MIN 3 VIEWS), LEFT (CPT=73630) INDICATIONS: L foot injury PATIENT STATED HISTORY: Patient states she injured left foot stpping in a hole last night. Great toe caught the edge pof the hole and was pulled to the side. Pain to left foot. COMPARISON: There are no comparisons for this exam.     CONCLUSION: No acute fracture or malalignment. Corticated ossific density along the dorsal talonavicular joint is either degenerative or sequela of remote trauma. Joint spaces are preserved. No periarticular erosions. Normal soft tissues. Electronically Verified and Signed by Attending Radiologist: Leslie Peguero MD 6/24/2025 9:34 AM Workstation: [id number]                    [facility]  Ddx-great toe fracture, great toe sprain, MTP fracture, foot sprain    On exam the patient is in no acute distress.  Isolated tenderness over the 1st and 2nd metatarsal first MTP joint.  There is no open wounds.  Her CMS is intact.  Compartments are soft.  X-ray confirmed no acute fractures.  Clinical exam is consistent with a foot sprain.  Offered the patient crutches postop shoe patient politely declined.  Will continue conservative treatment rest as needed.  Podiatry follow-up was provided.  All questions were answered patient and patient is comfortable with treatment plan and discharge    Medical Decision Making  Problems Addressed:  Injury of left foot, initial encounter: acute illness or injury  Sprain of left foot, initial encounter: acute illness or injury    Amount and/or Complexity of Data  Reviewed  Radiology: ordered and independent interpretation performed. Decision-making details documented in ED Course.    Risk  OTC drugs.        Disposition and Plan     Clinical Impression:  1. Sprain of left foot, initial encounter    2. Injury of left foot, initial encounter         Disposition:  Discharge  6/24/2025  9:41 am    Follow-up:  Luis Felipe Bond DPM  [address] [place] [address]  [phone]                Medications Prescribed:  Current Discharge Medication List                Supplementary Documentation:

## 2025-08-20 ENCOUNTER — TELEPHONE (OUTPATIENT)
Dept: NEUROLOGY | Facility: CLINIC | Age: 40
End: 2025-08-20

## 2025-08-28 ENCOUNTER — HOSPITAL ENCOUNTER (EMERGENCY)
Age: 40
Discharge: HOME | End: 2025-08-28
Attending: EMERGENCY MEDICINE

## 2025-08-28 VITALS
TEMPERATURE: 99 F | HEIGHT: 68 IN | WEIGHT: 280 LBS | HEART RATE: 92 BPM | RESPIRATION RATE: 18 BRPM | SYSTOLIC BLOOD PRESSURE: 133 MMHG | OXYGEN SATURATION: 97 % | DIASTOLIC BLOOD PRESSURE: 85 MMHG | BODY MASS INDEX: 42.44 KG/M2

## 2025-08-28 DIAGNOSIS — K29.00 ACUTE GASTRITIS WITHOUT HEMORRHAGE, UNSPECIFIED GASTRITIS TYPE: Primary | ICD-10-CM

## 2025-08-28 LAB
ALBUMIN SERPL-MCNC: 4.6 G/DL (ref 3.2–4.8)
ALBUMIN/GLOB SERPL: 1.6 (ref 1–2)
ALP LIVER SERPL-CCNC: 91 U/L (ref 37–98)
ALT SERPL-CCNC: 10 U/L (ref 10–49)
ANION GAP SERPL CALC-SCNC: 10 MMOL/L (ref 0–18)
AST SERPL-CCNC: 13 U/L (ref ?–34)
B-HCG UR QL: NEGATIVE
BASOPHILS # BLD AUTO: 0.06 X10(3) UL (ref 0–0.2)
BASOPHILS NFR BLD AUTO: 0.8 %
BILIRUB SERPL-MCNC: 0.5 MG/DL (ref 0.3–1.2)
BILIRUB UR QL STRIP.AUTO: NEGATIVE
BUN BLD-MCNC: <5 MG/DL (ref 9–23)
CALCIUM BLD-MCNC: 9.1 MG/DL (ref 8.7–10.6)
CHLORIDE SERPL-SCNC: 105 MMOL/L (ref 98–112)
CLARITY UR REFRACT.AUTO: CLEAR
CO2 SERPL-SCNC: 26 MMOL/L (ref 21–32)
COLOR UR AUTO: YELLOW
CREAT BLD-MCNC: 0.75 MG/DL (ref 0.55–1.02)
EGFRCR SERPLBLD CKD-EPI 2021: 104 ML/MIN/1.73M2 (ref 60–?)
EOSINOPHIL # BLD AUTO: 0.28 X10(3) UL (ref 0–0.7)
EOSINOPHIL NFR BLD AUTO: 3.6 %
ERYTHROCYTE [DISTWIDTH] IN BLOOD BY AUTOMATED COUNT: 12.6 %
GLOBULIN PLAS-MCNC: 2.8 G/DL (ref 2–3.5)
GLUCOSE BLD-MCNC: 113 MG/DL (ref 70–99)
GLUCOSE UR STRIP.AUTO-MCNC: NEGATIVE MG/DL
HCT VFR BLD AUTO: 38.3 % (ref 35–48)
HGB BLD-MCNC: 13.4 G/DL (ref 12–16)
IMM GRANULOCYTES # BLD AUTO: 0.04 X10(3) UL (ref 0–1)
IMM GRANULOCYTES NFR BLD: 0.5 %
KETONES UR STRIP.AUTO-MCNC: NEGATIVE MG/DL
LEUKOCYTE ESTERASE UR QL STRIP.AUTO: NEGATIVE
LIPASE SERPL-CCNC: 30 U/L (ref 12–53)
LYMPHOCYTES # BLD AUTO: 2.34 X10(3) UL (ref 1–4)
LYMPHOCYTES NFR BLD AUTO: 30.4 %
MCH RBC QN AUTO: 31.5 PG (ref 26–34)
MCHC RBC AUTO-ENTMCNC: 35 G/DL (ref 31–37)
MCV RBC AUTO: 89.9 FL (ref 80–100)
MONOCYTES # BLD AUTO: 0.33 X10(3) UL (ref 0.1–1)
MONOCYTES NFR BLD AUTO: 4.3 %
NEUTROPHILS # BLD AUTO: 4.65 X10 (3) UL (ref 1.5–7.7)
NEUTROPHILS # BLD AUTO: 4.65 X10(3) UL (ref 1.5–7.7)
NEUTROPHILS NFR BLD AUTO: 60.4 %
NITRITE UR QL STRIP.AUTO: NEGATIVE
PH UR STRIP.AUTO: 7 (ref 5–8)
PLATELET # BLD AUTO: 329 10(3)UL (ref 150–450)
POTASSIUM SERPL-SCNC: 3.4 MMOL/L (ref 3.5–5.1)
PROT SERPL-MCNC: 7.4 G/DL (ref 5.7–8.2)
RBC # BLD AUTO: 4.26 X10(6)UL (ref 3.8–5.3)
RBC UR QL AUTO: NEGATIVE
SODIUM SERPL-SCNC: 141 MMOL/L (ref 136–145)
SP GR UR STRIP.AUTO: 1.01 (ref 1–1.03)
UROBILINOGEN UR STRIP.AUTO-MCNC: 0.2 MG/DL
WBC # BLD AUTO: 7.7 X10(3) UL (ref 4–11)

## 2025-08-28 PROCEDURE — 81001 URINALYSIS AUTO W/SCOPE: CPT | Performed by: EMERGENCY MEDICINE

## 2025-08-28 PROCEDURE — 85025 COMPLETE CBC W/AUTO DIFF WBC: CPT | Performed by: EMERGENCY MEDICINE

## 2025-08-28 PROCEDURE — 99284 EMERGENCY DEPT VISIT MOD MDM: CPT

## 2025-08-28 PROCEDURE — 83690 ASSAY OF LIPASE: CPT | Performed by: EMERGENCY MEDICINE

## 2025-08-28 PROCEDURE — 96375 TX/PRO/DX INJ NEW DRUG ADDON: CPT

## 2025-08-28 PROCEDURE — 80053 COMPREHEN METABOLIC PANEL: CPT | Performed by: EMERGENCY MEDICINE

## 2025-08-28 PROCEDURE — 96361 HYDRATE IV INFUSION ADD-ON: CPT

## 2025-08-28 PROCEDURE — 96374 THER/PROPH/DIAG INJ IV PUSH: CPT

## 2025-08-28 PROCEDURE — 81015 MICROSCOPIC EXAM OF URINE: CPT | Performed by: EMERGENCY MEDICINE

## 2025-08-28 PROCEDURE — 81025 URINE PREGNANCY TEST: CPT

## 2025-08-28 RX ORDER — POTASSIUM CHLORIDE 1500 MG/1
20 TABLET, EXTENDED RELEASE ORAL ONCE
Status: COMPLETED | OUTPATIENT
Start: 2025-08-28 | End: 2025-08-28

## 2025-08-28 RX ORDER — SUCRALFATE ORAL 1 G/10ML
1 SUSPENSION ORAL
Qty: 280 ML | Refills: 0 | Status: SHIPPED | OUTPATIENT
Start: 2025-08-28 | End: 2025-09-04

## 2025-08-28 RX ORDER — ONDANSETRON 4 MG/1
4 TABLET, ORALLY DISINTEGRATING ORAL EVERY 6 HOURS PRN
Qty: 20 TABLET | Refills: 0 | Status: SHIPPED | OUTPATIENT
Start: 2025-08-28 | End: 2025-09-04

## 2025-08-28 RX ORDER — ONDANSETRON 2 MG/ML
4 INJECTION INTRAMUSCULAR; INTRAVENOUS ONCE
Status: COMPLETED | OUTPATIENT
Start: 2025-08-28 | End: 2025-08-28

## (undated) DIAGNOSIS — G44.209 MIXED MIGRAINE AND MUSCLE CONTRACTION HEADACHE: ICD-10-CM

## (undated) DIAGNOSIS — G43.709 CHRONIC MIGRAINE W/O AURA, NOT INTRACTABLE, W/O STAT MIGR: ICD-10-CM

## (undated) DIAGNOSIS — G43.909 MIXED MIGRAINE AND MUSCLE CONTRACTION HEADACHE: ICD-10-CM

## (undated) DEVICE — DRAPE C-ARM UNIVERSAL

## (undated) DEVICE — OPEN-END FLEXI-TIP URETERAL CATHETER: Brand: FLEXI-TIP

## (undated) DEVICE — SUTURE VICRYL 5-0 RB-1

## (undated) DEVICE — SYRINGE 30ML LL TIP

## (undated) DEVICE — ENDOPATH XCEL UNIVERSAL TROCAR STABLILITY SLEEVES: Brand: ENDOPATH XCEL

## (undated) DEVICE — GLOVE BIOGEL M SURG SZ 6-1/2

## (undated) DEVICE — CHLORAPREP 26ML APPLICATOR

## (undated) DEVICE — SUTURE VICRYL 0 UR-6

## (undated) DEVICE — ENDOPATH XCEL DILATING TIP TROCARS WITH STABILITY SLEEVES: Brand: ENDOPATH XCEL

## (undated) DEVICE — DRAPE HALF 40X58 DYNJP2410

## (undated) DEVICE — KENDALL SCD EXPRESS SLEEVES, KNEE LENGTH, MEDIUM: Brand: KENDALL SCD

## (undated) DEVICE — LIGAMAX 5 MM ENDOSCOPIC MULTIPLE CLIP APPLIER: Brand: LIGAMAX

## (undated) DEVICE — ANCHOR TISSUE RETRIEVAL SYSTEM, BAG SIZE 175 ML, PORT SIZE 10 MM: Brand: ANCHOR TISSUE RETRIEVAL SYSTEM

## (undated) DEVICE — BANDAID COVERLET 1X3

## (undated) DEVICE — SOL  .9 1000ML BTL

## (undated) DEVICE — ENDOPATH XCEL WITH OPTIVIEW TECHNOLOGY BLADELESS TROCARS WITH STABILITY SLEEVES: Brand: ENDOPATH XCEL OPTIVIEW

## (undated) DEVICE — ZIPWIRE GUIDEWIRE .035X150 STR

## (undated) DEVICE — SUTURE VICRYL 0

## (undated) DEVICE — LAP CHOLE/APPY CDS-LF: Brand: MEDLINE INDUSTRIES, INC.

## (undated) DEVICE — Device

## (undated) NOTE — Clinical Note
[facility] M.D., F.A.C.S.     Surgical Clearance Needed    Date: 5/16/2017                                                                       From: [facility]    Attn: Dr. Bret Long

## (undated) NOTE — ED AVS SNAPSHOT
Yenifer Lazar   MRN: [id number]    Department:  [facility] Department in [place]   Date of Visit:  12/26/2018           Disclosure     Insurance plans vary and the physician(s) referred by the ER may not be covered by your plan.  Please contac tell this physician (or your personal doctor if your instructions are to return to your personal doctor) about any new or lasting problems. The primary care or specialist physician will see patients referred from the [facility] Emergency Department.  Mumtaz Barros

## (undated) NOTE — MR AVS SNAPSHOT
[facility]  [address] [address] [phone]               Thank you for choosing us for your health care visit with Inocencio Brown MD.  We are glad to serve you and happy to provide you with this summary of y Take 1 tablet by mouth every 12 (twelve) hours as needed. Commonly known as:  FIORICET, ESGIC           Dicyclomine HCl 10 MG Caps   Take 10 mg by mouth as needed.    Commonly known as:  BENTYL           ergocalciferol 15118 units Caps   Take 1 capsule (5 view more details from this visit by going to [email or web address]. If you've recently had a stay at the Hospital you can access your discharge instructions in [employer] by going to Visits < Admission Summaries.  If you've been to the Emergency Depar

## (undated) NOTE — ED AVS SNAPSHOT
[facility] Emergency Department    [place]  [address] [address]    Phone:  [phone]    Fax:  Phillip Osullivan   MRN: [id number]    Department:  [facility] Emergency Department   Date of Visit:  5/4/2 Return for any problems.       Discharge References/Attachments     DIARRHEA, BACTERIAL (6Y-ADULT) (ENGLISH)    CLOSTRIDIUM DIFFICILE TOXIN (STOOL) (ENGLISH)      Disclosure     Insurance plans vary and the physician(s) referred by the ER may not be covered CARE PHYSICIAN AT ONCE OR RETURN IMMEDIATELY TO THE EMERGENCY DEPARTMENT.     If you have been prescribed any medication(s), please fill your prescription right away and begin taking the medication(s) as directed    If the emergency physician has read X-ray coverage. Patient [address] is a Federal Navigator program that can help with your Affordable Care Act coverage, as well as all types of Medicaid plans.   To get signed up and covered, please call [phone] and ask to get set up for an insuran

## (undated) NOTE — LETTER
Date & Time: 10/30/2021, 10:08 AM  Patient: Walter Almaguer  Encounter Provider(s):    Michael Jin PA-C       To Whom It May Concern:    Racheal Paul was seen and treated in our department on 10/30/2021. She should not return to work until 11/2/21.

## (undated) NOTE — LETTER
Date & Time: 8/12/2021, 11:24 AM  Patient: Angie Walter  Encounter Provider(s):    Tommy Lord PA-C         This certifies that Gayathri Nunez, a patient at an [facility], am leaving the facility voluntarily and again

## (undated) NOTE — LETTER
September 28, 2024    Olamide Ybarra  [address] [facility] [address]      Dear Olamide:    The following are the results of your recent tests. Please review the list of test results.  Your result is the value on the left; we have also supplied the range of normal (low and high) values.    Results for orders placed or performed in visit on 09/26/24   TB INTRADERMAL TEST   Result Value Ref Range    Date Given: 09/26/2024     Site: right forearm     INDURATION (PPD) 0.0 0.0 - 11 mm       Please call if you have further questions,

## (undated) NOTE — Clinical Note
2017    Patient: Calixto Mendez  : 1985 Visit date: 2017    Dear  Dr. Felicia Mendoza MD,    Thank you for referring Calixto Mendez to my practice. Please find my assessment and plan below.        Assessment:      The patient is a 31-year-old fema Plan: Laparoscopic cholecystectomy with intraoperative cholangiogram to be scheduled on May 22, 2017. Thank you for allowing me to participate in your patient's care.          Sincerely,       Channing Fuller MD   CC: No Recipients

## (undated) NOTE — ED AVS SNAPSHOT
[facility] Emergency Department    [place]  [address] [address]    Phone:  [phone]    Fax:  Phillip Osullivan   MRN: [id number]    Department:  [facility] Emergency Department   Date of Visit:  5/4/2 IF THERE IS ANY CHANGE OR WORSENING OF YOUR CONDITION, CALL YOUR PRIMARY CARE PHYSICIAN AT ONCE OR RETURN IMMEDIATELY TO THE EMERGENCY DEPARTMENT.     If you have been prescribed any medication(s), please fill your prescription right away and begin taking t

## (undated) NOTE — MR AVS SNAPSHOT
[facility]  [address] [address] [phone]               Thank you for choosing us for your health care visit with Zay Jansen MD.  We are glad to serve you and happy to provide you with this summary of y Commonly known as:  FIORICET, ESGIC           Dicyclomine HCl 10 MG Caps   Take 10 mg by mouth as needed. Commonly known as:  BENTYL           ergocalciferol 78137 units Caps   Take 1 capsule (50,000 Units total) by mouth twice a week.    Commonly known a You can access your MyChart to more actively manage your health care and view more details from this visit by going to [email or web address].   If you've recently had a stay at the Hospital you can access your discharge instructions in [address] by [email or web address]

## (undated) NOTE — MR AVS SNAPSHOT
[address], [place] [address]               Thank you for choosing us for your health care visit with Pako Cole MD.  We are glad to serve you and happy to provide you with this ? Written prescriptions must be picked up in office. ? Please allow the office 48-72 hours to fill the prescription. ? Patient must present photo ID at time of .   If a designated family member will be picking up prescription, office must be given This list is accurate as of: 3/7/17  2:22 PM.  Always use your most recent med list.                * Albuterol Sulfate  (90 Base) MCG/ACT Aers   Inhale 2 puffs into the lungs every 4 (four) hours as needed for Wheezing or Shortness of Breath. TAKE 1 TABLET BY MOUTH 1 TIME ONLY. REPEAT AFTER 2 HOURS AS NEEDED.  MAX 2 PER DAY           Sertraline HCl 100 MG Tabs   TAKE 2 TABLETS BY MOUTH EVERY NIGHT   Commonly known as:  ZOLOFT           topiramate 100 MG Tabs   Take 1 tablet (100 mg total) by sanjeev

## (undated) NOTE — LETTER
September 28, 2024    Olamide Ybarra  [address] [facility] [address]      Dear Olamide:    The following are the results of your recent tests. Please review the list of test results.  Your result is the value on the left; we have also supplied the range of normal (low and high) values.    Results for orders placed or performed in visit on 09/26/24   TB INTRADERMAL TEST   Result Value Ref Range    Date Given: 09/27/2024     Site: right forearm     INDURATION (PPD) 0.0 0.0 - 11 mm       Please call if you have further questions,

## (undated) NOTE — LETTER
Date & Time: 10/24/2024, 10:19 AM  Patient: Olamide Ybarra  Encounter Provider(s):    Jw Wright DO Leiker, Brenna, PA       To Whom It May Concern:    Olamide Ybarra was seen and treated in our department on 10/24/2024. She can return to work with these limitations: no heavy lifting until symptoms improve .    If you have any questions or concerns, please do not hesitate to call.        _____________________________  Physician/APC Signature

## (undated) NOTE — Clinical Note
Post Operative Visit Note       Active Problems  No diagnosis found. Chief Complaint   Patient presents with:  Gallbladder: 1st PO LAPAROSCOPIC CHOLECYSTECTOMY WITH CHOLANGIOGRAM on 5/22 @.          History of Present Illness         Allergies  Santosh • Stroke Maternal Grandmother    • High Cholesterol Mother    • Diabetes Mother    • Stroke Mother    • Depression Mother    • High Cholesterol Maternal Grandfather    • Stroke Maternal Grandfather    • Hypertension Maternal Grandfather    • Substance Abus ergocalciferol 68924 units Oral Cap Take 1 capsule (50,000 Units total) by mouth twice a week. Disp: 8 capsule Rfl: 11   B Complex Vitamins (B COMPLEX OR) Take 1 capsule by mouth every evening.  Disp:  Rfl:    Dicyclomine HCl 10 MG Oral Cap Take 10 mg by mo Skin: Negative for color change and rash. Neurological: Negative for tremors, syncope and weakness. Hematological: Negative for adenopathy. Does not bruise/bleed easily. Psychiatric/Behavioral: Negative for behavioral problems and sleep disturbance.

## (undated) NOTE — LETTER
Last Revised 02/07/06  Obstructive Sleep Apnea Questionnaire    Clinical signs and symptoms suggesting the possibility of LEE    1. Predisposing physical characteristics (positive with any of the following present)  ? BMI 35kg/m²  ?  Craniofacial abnormalit pauses which are frightening to the observer, patient regularly falls asleep within minutes after being left unstimulated) in which case they should be treated as though they have severe sleep apnea.     The sleep laboratory’s assessment (none, mild, modera Point Total for B           C. Requirement for postoperative opioids.                Opioid requirement             Points   None 0    Low dose oral opiod 1    High dose oral opioids, parenteral or neuraxial opiods 3      Point Total for C        Estimation

## (undated) NOTE — IP AVS SNAPSHOT
[facility] [place] [address] [place] [address], [address] [place] [address] Rd ~ [phone]                Discharge Summary   5/22/2017    [employer] [facility]oe           Admission Information        Provider Department    5/22/2017 Herminio Mcknight MD  Pacu      Surg [    ]    [    ]    [    ]       ferrous sulfate 325 (65 FE) MG Tbec        Take 325 mg by mouth daily with breakfast.      [    ]    [    ]    [    ]    [    ]       lansoprazole 30 MG Cpdr   Commonly known as:  PREVACID        TAKE ONE CAPSULE BY MOUTH does not feel they need the narcotics they shouldn’t take them. If the pain is severe the patient may take up to two pills every four hours.   If a minor supplement is necessary in addition to the narcotics do not overlap with Tylenol (any product with ace baths, swimming or sitting in a hot tub for two weeks. If visible sutures or staples are present they will be removed in the office by the surgeon or nurse. Most wounds will be closed with dissolving suture underneath the skin.   These sutures will dissol at [phone]. For life threatening emergencies call 911. For non-emergent care please call our office after 8:30 a.m. Monday through Friday.   The number listed above is our office number; our phone automatically switches to our answering service if 0.12 (05/04/17)  0.06    (05/01/17)  58.9 (05/01/17)  35.0 (05/01/17)  3.5 (05/01/17)  1.5 (05/01/17)  0.8  (05/01/17)  4.69 (05/01/17)  2.78 (05/01/17)  0.28 (05/01/17)  0.12 (05/01/17)  2.79      Metabolic Lab Results  (Last result in the past 90 days) [email or web address]. If you've recently had a stay at the Hospital you can access your discharge instructions in [employer] by going to Visits < Admission Summaries.  If you've been to the Emergency Department or your doctor's office, you can view yo

## (undated) NOTE — MR AVS SNAPSHOT
[address], [place] [id number]               Thank you for choosing us for your health care visit with Ligia Rod MD.  We are glad to serve you and happy to provide you with this ? EFFECTIVE April 1, 2017 PATIENTS MUST  THEIR OWN NARCOTIC PRESCRIPTIONS. ? Written prescriptions must be picked up in office. ? Please allow the office 48-72 hours to fill the prescription. ? Patient must present photo ID at time of . Inhale 2 puffs into the lungs every 4 (four) hours as needed for Wheezing or Shortness of Breath. * albuterol sulfate (2.5 MG/3ML) 0.083% Nebu   Take 3 mL (2.5 mg total) by nebulization every 6 (six) hours as needed.    Commonly known as:  Yue Wang Ondansetron HCl 4 mg tablet   Take 1 tablet (4 mg total) by mouth every 6 (six) hours as needed for Nausea.    Commonly known as:  ZOFRAN           Sertraline HCl 100 MG Tabs   TAKE 2 TABLETS BY MOUTH EVERY NIGHT   Commonly known as:  ZOLOFT           topi

## (undated) NOTE — MR AVS SNAPSHOT
[address] [address]               Thank you for choosing us for your health care visit with Miladis Chin MD.  We are glad to serve you and happy to provide you with this ? To best provide you care, patients receiving routine medications need to be seen at least once a year.  protocol for controlled substances:  Written prescriptions      ?  EFFECTIVE April 1, 2017 PATIENTS MUST  THEIR OWN NARCOTIC PRESCRIPT Today's Vital Signs     BP Pulse Weight             126/76 mmHg 80 250 lb            Current Medications          This list is accurate as of: 6/9/17  4:30 PM.  Always use your most recent med list.                * Albuterol Sulfate  (90 Base) M Sertraline HCl 100 MG Tabs   TAKE 2 TABLETS BY MOUTH EVERY NIGHT   Commonly known as:  ZOLOFT           TiZANidine HCl 2 MG Tabs   Take 1 tablet (2 mg total) by mouth nightly.    Commonly known as:  ZANAFLEX           topiramate 100 MG Tabs   Take 100 mg A

## (undated) NOTE — LETTER
Date & Time: 1/20/2024, 2:34 PM  Patient: Olamide Ybarra  Encounter Provider(s):    Omar Abel APRN       To Whom It May Concern:    Olamide Ybarra was seen and treated in our department on 1/20/2024. 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     If you have any questions or concerns, please do not hesitate to call.        _____________________________  Physician/APC Signature

## (undated) NOTE — LETTER
Date & Time: 12/6/2024, 2:36 PM  Patient: Olamide Ybarra  Encounter Provider(s):    Brannon Adkins PA       To Whom It May Concern:    Olamide Ybarra was seen and treated in our department on 12/6/2024. She can return to work.    If you have any questions or concerns, please do not hesitate to call.        _____________________________  Physician/APC Signature

## (undated) NOTE — Clinical Note
Patient Name: Ray Bonilla        : 1985       Medical Record #: [id number]    CONSENT FOR PROCEDURES/SEDATION    Date: 5/15/2017       Time: 3:26 PM        1. I authorize the performance upon Ray Bonilla the following:   Toradol and Decadron

## (undated) NOTE — LETTER
Date & Time: 3/12/2024, 1:36 PM  Patient: Olamide Ybarra  Encounter Provider(s):    Yumiko Krause APRN       To Whom It May Concern:    Olamide Ybarra was seen and treated in our department on 3/12/2024. She can return to work 3/14/24.    If you have any questions or concerns, please do not hesitate to call.        _____________________________  Physician/APC Signature

## (undated) NOTE — ED AVS SNAPSHOT
[facility] in [address] [place] [address] Drive,[address]    [address] [facility]    Phone:  [phone]    Fax:  Frank Montiel   MRN: [id number]    Department:  [facility] END   Date of Visit:  2/26/2017 Quantity:  100 mL   Commonly known as:  ROBITUSSIN AC   Take 5 mL by mouth every 6 (six) hours as needed.          CHANGE how you take these medications     predniSONE 20 MG Tabs   Quantity:  16 tablet   Commonly known as:  DELTASONE   3 tab po daily for 2 self-assessment the day after your visit. You may also receive a call from our patient liason soon after your visit. Also, some patients receive a detailed feedback survey mailed to them a week after the visit.   If you receive this, we would really apprec [facility] [address] [facility] [id number] [address]67 [address] ([address]) [facility] [address] [facility]. ([address]) [facility]  [facility] PATIENT STATED HISTORY:  Cough, congestion, shortness of breath since yesterday morning. Low grade temp last night. Patient shielded. Patient is a smoker. FINDINGS:    LUNGS:  No focal consolidation.  Increased parahilar markings are seen with bronc